# Patient Record
Sex: FEMALE | Race: WHITE | NOT HISPANIC OR LATINO | Employment: UNEMPLOYED | URBAN - METROPOLITAN AREA
[De-identification: names, ages, dates, MRNs, and addresses within clinical notes are randomized per-mention and may not be internally consistent; named-entity substitution may affect disease eponyms.]

---

## 2018-11-01 LAB — PAP SMEAR - HIM PATIENT REPORTED: NEGATIVE

## 2021-02-17 LAB — HIV 1&2 EXT: NORMAL

## 2021-09-10 ENCOUNTER — HOSPITAL ENCOUNTER (INPATIENT)
Facility: HOSPITAL | Age: 36
LOS: 2 days | Discharge: HOME | End: 2021-09-13
Attending: OBSTETRICS & GYNECOLOGY | Admitting: OBSTETRICS & GYNECOLOGY
Payer: COMMERCIAL

## 2021-09-10 DIAGNOSIS — Z32.01 PREGNANCY TEST POSITIVE: Primary | ICD-10-CM

## 2021-09-10 DIAGNOSIS — Z3A.41 41 WEEKS GESTATION OF PREGNANCY: Primary | ICD-10-CM

## 2021-09-10 DIAGNOSIS — O48.0 41 WEEKS GESTATION OF PREGNANCY: Primary | ICD-10-CM

## 2021-09-11 ENCOUNTER — ANESTHESIA (OUTPATIENT)
Dept: OBGYN | Facility: HOSPITAL | Age: 36
End: 2021-09-11
Payer: COMMERCIAL

## 2021-09-11 ENCOUNTER — ANESTHESIA EVENT (OUTPATIENT)
Dept: OBGYN | Facility: HOSPITAL | Age: 36
End: 2021-09-11
Payer: COMMERCIAL

## 2021-09-11 PROBLEM — Z34.90 PREGNANCY: Status: ACTIVE | Noted: 2021-09-11

## 2021-09-11 PROBLEM — Z36.89 ENCOUNTER FOR TRIAGE IN PREGNANT PATIENT: Status: ACTIVE | Noted: 2021-09-11

## 2021-09-11 LAB
ABO/RH(D): NORMAL
ANTIBODY SCREEN: NEGATIVE
HOLD SPECIMEN: NORMAL
SARS-COV-2 RNA RESP QL NAA+PROBE: NEGATIVE
SPECIMEN EXPIRATION DATE: NORMAL
T PALLIDUM AB SER QL: NEGATIVE

## 2021-09-11 PROCEDURE — 10907ZC DRAINAGE OF AMNIOTIC FLUID, THERAPEUTIC FROM PRODUCTS OF CONCEPTION, VIA NATURAL OR ARTIFICIAL OPENING: ICD-10-PCS | Performed by: OBSTETRICS & GYNECOLOGY

## 2021-09-11 PROCEDURE — 250N000013 HC RX MED GY IP 250 OP 250 PS 637: Performed by: OBSTETRICS & GYNECOLOGY

## 2021-09-11 PROCEDURE — 10H07YZ INSERTION OF OTHER DEVICE INTO PRODUCTS OF CONCEPTION, VIA NATURAL OR ARTIFICIAL OPENING: ICD-10-PCS | Performed by: OBSTETRICS & GYNECOLOGY

## 2021-09-11 PROCEDURE — 120N000001 HC R&B MED SURG/OB

## 2021-09-11 PROCEDURE — 250N000011 HC RX IP 250 OP 636: Performed by: ANESTHESIOLOGY

## 2021-09-11 PROCEDURE — 370N000003 HC ANESTHESIA WARD SERVICE

## 2021-09-11 PROCEDURE — 0HQ9XZZ REPAIR PERINEUM SKIN, EXTERNAL APPROACH: ICD-10-PCS | Performed by: OBSTETRICS & GYNECOLOGY

## 2021-09-11 PROCEDURE — 86780 TREPONEMA PALLIDUM: CPT | Performed by: OBSTETRICS & GYNECOLOGY

## 2021-09-11 PROCEDURE — 36415 COLL VENOUS BLD VENIPUNCTURE: CPT | Performed by: OBSTETRICS & GYNECOLOGY

## 2021-09-11 PROCEDURE — 250N000011 HC RX IP 250 OP 636: Performed by: OBSTETRICS & GYNECOLOGY

## 2021-09-11 PROCEDURE — 258N000003 HC RX IP 258 OP 636: Performed by: OBSTETRICS & GYNECOLOGY

## 2021-09-11 PROCEDURE — 250N000009 HC RX 250: Performed by: ANESTHESIOLOGY

## 2021-09-11 PROCEDURE — 86900 BLOOD TYPING SEROLOGIC ABO: CPT | Performed by: OBSTETRICS & GYNECOLOGY

## 2021-09-11 PROCEDURE — 87635 SARS-COV-2 COVID-19 AMP PRB: CPT | Performed by: OBSTETRICS & GYNECOLOGY

## 2021-09-11 PROCEDURE — 722N000001 HC LABOR CARE VAGINAL DELIVERY SINGLE

## 2021-09-11 RX ORDER — LIDOCAINE 40 MG/G
CREAM TOPICAL
Status: DISCONTINUED | OUTPATIENT
Start: 2021-09-11 | End: 2021-09-11 | Stop reason: HOSPADM

## 2021-09-11 RX ORDER — OXYTOCIN 10 [USP'U]/ML
10 INJECTION, SOLUTION INTRAMUSCULAR; INTRAVENOUS
Status: DISCONTINUED | OUTPATIENT
Start: 2021-09-11 | End: 2021-09-13 | Stop reason: HOSPADM

## 2021-09-11 RX ORDER — ONDANSETRON 2 MG/ML
4 INJECTION INTRAMUSCULAR; INTRAVENOUS EVERY 6 HOURS PRN
Status: DISCONTINUED | OUTPATIENT
Start: 2021-09-11 | End: 2021-09-11 | Stop reason: HOSPADM

## 2021-09-11 RX ORDER — HYDROCORTISONE 2.5 %
CREAM (GRAM) TOPICAL 3 TIMES DAILY PRN
Status: DISCONTINUED | OUTPATIENT
Start: 2021-09-11 | End: 2021-09-13 | Stop reason: HOSPADM

## 2021-09-11 RX ORDER — IBUPROFEN 600 MG/1
600 TABLET, FILM COATED ORAL
Status: DISCONTINUED | OUTPATIENT
Start: 2021-09-11 | End: 2021-09-13 | Stop reason: HOSPADM

## 2021-09-11 RX ORDER — METHYLERGONOVINE MALEATE 0.2 MG/ML
200 INJECTION INTRAVENOUS
Status: DISCONTINUED | OUTPATIENT
Start: 2021-09-11 | End: 2021-09-11 | Stop reason: HOSPADM

## 2021-09-11 RX ORDER — MORPHINE SULFATE 10 MG/ML
10 INJECTION, SOLUTION INTRAMUSCULAR; INTRAVENOUS ONCE
Status: COMPLETED | OUTPATIENT
Start: 2021-09-11 | End: 2021-09-11

## 2021-09-11 RX ORDER — NALOXONE HYDROCHLORIDE 0.4 MG/ML
0.2 INJECTION, SOLUTION INTRAMUSCULAR; INTRAVENOUS; SUBCUTANEOUS
Status: DISCONTINUED | OUTPATIENT
Start: 2021-09-11 | End: 2021-09-11 | Stop reason: HOSPADM

## 2021-09-11 RX ORDER — MISOPROSTOL 200 UG/1
800 TABLET ORAL
Status: DISCONTINUED | OUTPATIENT
Start: 2021-09-11 | End: 2021-09-11 | Stop reason: HOSPADM

## 2021-09-11 RX ORDER — METOCLOPRAMIDE 10 MG/1
10 TABLET ORAL EVERY 6 HOURS PRN
Status: DISCONTINUED | OUTPATIENT
Start: 2021-09-11 | End: 2021-09-11 | Stop reason: HOSPADM

## 2021-09-11 RX ORDER — NALOXONE HYDROCHLORIDE 0.4 MG/ML
0.4 INJECTION, SOLUTION INTRAMUSCULAR; INTRAVENOUS; SUBCUTANEOUS
Status: DISCONTINUED | OUTPATIENT
Start: 2021-09-11 | End: 2021-09-11 | Stop reason: HOSPADM

## 2021-09-11 RX ORDER — MISOPROSTOL 200 UG/1
400 TABLET ORAL
Status: DISCONTINUED | OUTPATIENT
Start: 2021-09-11 | End: 2021-09-11 | Stop reason: HOSPADM

## 2021-09-11 RX ORDER — EPHEDRINE SULFATE 50 MG/ML
5 INJECTION, SOLUTION INTRAMUSCULAR; INTRAVENOUS; SUBCUTANEOUS
Status: DISCONTINUED | OUTPATIENT
Start: 2021-09-11 | End: 2021-09-11 | Stop reason: HOSPADM

## 2021-09-11 RX ORDER — METHYLERGONOVINE MALEATE 0.2 MG/ML
200 INJECTION INTRAVENOUS
Status: DISCONTINUED | OUTPATIENT
Start: 2021-09-11 | End: 2021-09-13 | Stop reason: HOSPADM

## 2021-09-11 RX ORDER — MODIFIED LANOLIN
OINTMENT (GRAM) TOPICAL
Status: DISCONTINUED | OUTPATIENT
Start: 2021-09-11 | End: 2021-09-13 | Stop reason: HOSPADM

## 2021-09-11 RX ORDER — IBUPROFEN 800 MG/1
800 TABLET, FILM COATED ORAL EVERY 6 HOURS PRN
Status: DISCONTINUED | OUTPATIENT
Start: 2021-09-11 | End: 2021-09-13 | Stop reason: HOSPADM

## 2021-09-11 RX ORDER — METOCLOPRAMIDE HYDROCHLORIDE 5 MG/ML
10 INJECTION INTRAMUSCULAR; INTRAVENOUS EVERY 6 HOURS PRN
Status: DISCONTINUED | OUTPATIENT
Start: 2021-09-11 | End: 2021-09-11 | Stop reason: HOSPADM

## 2021-09-11 RX ORDER — PRENATAL VIT/IRON FUM/FOLIC AC 27MG-0.8MG
1 TABLET ORAL DAILY
COMMUNITY
End: 2021-12-10

## 2021-09-11 RX ORDER — KETOROLAC TROMETHAMINE 30 MG/ML
30 INJECTION, SOLUTION INTRAMUSCULAR; INTRAVENOUS
Status: DISCONTINUED | OUTPATIENT
Start: 2021-09-11 | End: 2021-09-13 | Stop reason: HOSPADM

## 2021-09-11 RX ORDER — LIDOCAINE HYDROCHLORIDE 20 MG/ML
SOLUTION OROPHARYNGEAL
Status: DISPENSED
Start: 2021-09-11 | End: 2021-09-11

## 2021-09-11 RX ORDER — MISOPROSTOL 200 UG/1
400 TABLET ORAL
Status: DISCONTINUED | OUTPATIENT
Start: 2021-09-11 | End: 2021-09-13 | Stop reason: HOSPADM

## 2021-09-11 RX ORDER — HYDROXYZINE HYDROCHLORIDE 50 MG/1
100 TABLET, FILM COATED ORAL ONCE
Status: COMPLETED | OUTPATIENT
Start: 2021-09-11 | End: 2021-09-11

## 2021-09-11 RX ORDER — LIDOCAINE HYDROCHLORIDE 10 MG/ML
INJECTION, SOLUTION EPIDURAL; INFILTRATION; INTRACAUDAL; PERINEURAL
Status: DISPENSED
Start: 2021-09-11 | End: 2021-09-11

## 2021-09-11 RX ORDER — MISOPROSTOL 200 UG/1
800 TABLET ORAL
Status: DISCONTINUED | OUTPATIENT
Start: 2021-09-11 | End: 2021-09-13 | Stop reason: HOSPADM

## 2021-09-11 RX ORDER — ONDANSETRON 2 MG/ML
4 INJECTION INTRAMUSCULAR; INTRAVENOUS EVERY 6 HOURS PRN
Status: DISCONTINUED | OUTPATIENT
Start: 2021-09-11 | End: 2021-09-13 | Stop reason: HOSPADM

## 2021-09-11 RX ORDER — FENTANYL CITRATE 50 UG/ML
50-100 INJECTION, SOLUTION INTRAMUSCULAR; INTRAVENOUS
Status: DISCONTINUED | OUTPATIENT
Start: 2021-09-11 | End: 2021-09-11 | Stop reason: HOSPADM

## 2021-09-11 RX ORDER — DOCUSATE SODIUM 100 MG/1
100 CAPSULE, LIQUID FILLED ORAL DAILY
Status: DISCONTINUED | OUTPATIENT
Start: 2021-09-11 | End: 2021-09-13 | Stop reason: HOSPADM

## 2021-09-11 RX ORDER — PROCHLORPERAZINE 25 MG
25 SUPPOSITORY, RECTAL RECTAL EVERY 12 HOURS PRN
Status: DISCONTINUED | OUTPATIENT
Start: 2021-09-11 | End: 2021-09-11 | Stop reason: HOSPADM

## 2021-09-11 RX ORDER — SODIUM CHLORIDE, SODIUM LACTATE, POTASSIUM CHLORIDE, CALCIUM CHLORIDE 600; 310; 30; 20 MG/100ML; MG/100ML; MG/100ML; MG/100ML
INJECTION, SOLUTION INTRAVENOUS CONTINUOUS PRN
Status: DISCONTINUED | OUTPATIENT
Start: 2021-09-11 | End: 2021-09-13 | Stop reason: HOSPADM

## 2021-09-11 RX ORDER — ACETAMINOPHEN 325 MG/1
650 TABLET ORAL EVERY 4 HOURS PRN
Status: DISCONTINUED | OUTPATIENT
Start: 2021-09-11 | End: 2021-09-13 | Stop reason: HOSPADM

## 2021-09-11 RX ORDER — CARBOPROST TROMETHAMINE 250 UG/ML
250 INJECTION, SOLUTION INTRAMUSCULAR
Status: DISCONTINUED | OUTPATIENT
Start: 2021-09-11 | End: 2021-09-11 | Stop reason: HOSPADM

## 2021-09-11 RX ORDER — BUPIVACAINE HYDROCHLORIDE 2.5 MG/ML
INJECTION, SOLUTION EPIDURAL; INFILTRATION; INTRACAUDAL
Status: COMPLETED | OUTPATIENT
Start: 2021-09-11 | End: 2021-09-11

## 2021-09-11 RX ORDER — OXYTOCIN/0.9 % SODIUM CHLORIDE 30/500 ML
340 PLASTIC BAG, INJECTION (ML) INTRAVENOUS CONTINUOUS PRN
Status: DISCONTINUED | OUTPATIENT
Start: 2021-09-11 | End: 2021-09-11 | Stop reason: HOSPADM

## 2021-09-11 RX ORDER — OXYTOCIN/0.9 % SODIUM CHLORIDE 30/500 ML
100-340 PLASTIC BAG, INJECTION (ML) INTRAVENOUS CONTINUOUS PRN
Status: DISCONTINUED | OUTPATIENT
Start: 2021-09-11 | End: 2021-09-13 | Stop reason: HOSPADM

## 2021-09-11 RX ORDER — CARBOPROST TROMETHAMINE 250 UG/ML
250 INJECTION, SOLUTION INTRAMUSCULAR
Status: DISCONTINUED | OUTPATIENT
Start: 2021-09-11 | End: 2021-09-13 | Stop reason: HOSPADM

## 2021-09-11 RX ORDER — OXYTOCIN 10 [USP'U]/ML
10 INJECTION, SOLUTION INTRAMUSCULAR; INTRAVENOUS
Status: DISCONTINUED | OUTPATIENT
Start: 2021-09-11 | End: 2021-09-11 | Stop reason: HOSPADM

## 2021-09-11 RX ORDER — OXYTOCIN/0.9 % SODIUM CHLORIDE 30/500 ML
340 PLASTIC BAG, INJECTION (ML) INTRAVENOUS CONTINUOUS PRN
Status: DISCONTINUED | OUTPATIENT
Start: 2021-09-11 | End: 2021-09-13 | Stop reason: HOSPADM

## 2021-09-11 RX ORDER — ONDANSETRON 4 MG/1
4 TABLET, ORALLY DISINTEGRATING ORAL EVERY 6 HOURS PRN
Status: DISCONTINUED | OUTPATIENT
Start: 2021-09-11 | End: 2021-09-11 | Stop reason: HOSPADM

## 2021-09-11 RX ORDER — PROCHLORPERAZINE MALEATE 10 MG
10 TABLET ORAL EVERY 6 HOURS PRN
Status: DISCONTINUED | OUTPATIENT
Start: 2021-09-11 | End: 2021-09-11 | Stop reason: HOSPADM

## 2021-09-11 RX ORDER — BISACODYL 10 MG
10 SUPPOSITORY, RECTAL RECTAL DAILY PRN
Status: DISCONTINUED | OUTPATIENT
Start: 2021-09-11 | End: 2021-09-13 | Stop reason: HOSPADM

## 2021-09-11 RX ORDER — NALBUPHINE HYDROCHLORIDE 10 MG/ML
2.5-5 INJECTION, SOLUTION INTRAMUSCULAR; INTRAVENOUS; SUBCUTANEOUS EVERY 6 HOURS PRN
Status: DISCONTINUED | OUTPATIENT
Start: 2021-09-11 | End: 2021-09-13 | Stop reason: HOSPADM

## 2021-09-11 RX ADMIN — KETOROLAC TROMETHAMINE 30 MG: 30 INJECTION, SOLUTION INTRAMUSCULAR at 19:07

## 2021-09-11 RX ADMIN — SODIUM CHLORIDE, POTASSIUM CHLORIDE, SODIUM LACTATE AND CALCIUM CHLORIDE 1000 ML: 600; 310; 30; 20 INJECTION, SOLUTION INTRAVENOUS at 13:48

## 2021-09-11 RX ADMIN — ACETAMINOPHEN 650 MG: 325 TABLET ORAL at 19:06

## 2021-09-11 RX ADMIN — SODIUM CHLORIDE, POTASSIUM CHLORIDE, SODIUM LACTATE AND CALCIUM CHLORIDE 200 ML/HR: 600; 310; 30; 20 INJECTION, SOLUTION INTRAVENOUS at 15:10

## 2021-09-11 RX ADMIN — MORPHINE SULFATE 10 MG: 10 INJECTION INTRAVENOUS at 04:05

## 2021-09-11 RX ADMIN — HYDROXYZINE HYDROCHLORIDE 100 MG: 50 TABLET ORAL at 02:05

## 2021-09-11 RX ADMIN — Medication 5 MG: at 15:10

## 2021-09-11 RX ADMIN — Medication: at 15:04

## 2021-09-11 RX ADMIN — ONDANSETRON 4 MG: 4 TABLET, ORALLY DISINTEGRATING ORAL at 10:53

## 2021-09-11 RX ADMIN — Medication 5 MG: at 16:45

## 2021-09-11 RX ADMIN — BUPIVACAINE HYDROCHLORIDE 10 ML: 2.5 INJECTION, SOLUTION EPIDURAL; INFILTRATION; INTRACAUDAL at 14:52

## 2021-09-11 RX ADMIN — FENTANYL CITRATE 100 MCG: 50 INJECTION, SOLUTION INTRAMUSCULAR; INTRAVENOUS at 13:53

## 2021-09-11 RX ADMIN — Medication 5 MG: at 15:15

## 2021-09-11 RX ADMIN — ONDANSETRON 4 MG: 2 INJECTION INTRAMUSCULAR; INTRAVENOUS at 19:20

## 2021-09-11 RX ADMIN — ONDANSETRON 4 MG: 4 TABLET, ORALLY DISINTEGRATING ORAL at 02:57

## 2021-09-11 ASSESSMENT — MIFFLIN-ST. JEOR: SCORE: 1558.17

## 2021-09-11 NOTE — PROGRESS NOTES
Dr Broderick notified of patient arrival and status.    Patient may have morphine 10mg IM and vistaril 100mg PO if she desires to promote comfort and rest.  Nitrous Oxide ok to use once Covid result is back.  Patient may have an epidural once in active labor.

## 2021-09-11 NOTE — PROGRESS NOTES
Nitrous consent reviewed and signed by patient and RN.  Nitrous set up and ready in the room. Patient in tub right now and declines until she returns to bed.

## 2021-09-11 NOTE — ANESTHESIA PROCEDURE NOTES
Epidural catheter Procedure Note  Pre-Procedure   Staff -        Anesthesiologist:  Monty Ortiz MD       Performed By: anesthesiologist       Location: OB       Procedure Start/Stop Times: 9/11/2021 2:45 PM and 9/11/2021 3:00 PM       Pre-Anesthestic Checklist: patient identified, IV checked, risks and benefits discussed, informed consent, monitors and equipment checked, pre-op evaluation, at physician/surgeon's request and post-op pain management  Timeout:       Correct Patient: Yes        Correct Procedure: Yes        Correct Site: Yes        Correct Position: Yes   Procedure Documentation  Procedure: epidural catheter       Diagnosis: IUP       Patient Position: sitting       Patient Prep/Sterile Barriers: sterile gloves, mask, patient draped       Skin prep: Chloraprep      Local skin infiltrated with mL of 1% lidocaine.        Insertion Site: T12-L1. (midline approach).       WAYNE at 6 cm.       Needle Type: HRBoss       Needle Gauge: 18.        Needle Length (Inches): 3.5        Catheter: 20 G.         Catheter threaded easily.         6 cm epidural space.         Threaded 12 cm at skin.         # of attempts: 1 and  # of redirects:  0    Assessment/Narrative         Paresthesias: No.      Test dose of 3 mL at 14:55 CDT.         Test dose negative, 3 minutes after injection, for signs of intravascular, subdural, or intrathecal injection.       Aspiration negative for Heme or CSF via Epidural Catheter.    Medication(s) Administered   0.25% Bupivacaine PF (Epidural), 10 mL  Medication Administration Time: 9/11/2021 2:52 PM

## 2021-09-11 NOTE — H&P
"OB HISTORY AND PHYSICAL UPDATE ADMISSION EXAM    Sarah Hatfield  1985  [id number]    HPI: 36yo  @41 3/7 wks.  Here for labor.      Estimated Date of Delivery: Sep 1, 2021                       EGA 41w3d    OB History    Para Term  AB Living   1 0 0 0 0 0   SAB TAB Ectopic Multiple Live Births   0 0 0 0 0      # Outcome Date GA Lbr Jose/2nd Weight Sex Delivery Anes PTL Lv   1 Current                Prenatal Complications None    Exam:    /55   Pulse 87   Temp 97.8  F (36.6  C) (Oral)   Resp 18   Ht 1.753 m (5' 9\")   Wt 79.4 kg (175 lb)   LMP 2020 (Exact Date)   SpO2 98%   Breastfeeding No   BMI 25.84 kg/m          HEENT          WNL              Heart              WNL               Lungs             WNL                      Abdomen        WNL                       Extremities     WNL                     Vaginal exam   /-1 per RN      Fetal Status     Category 1    Assessment: Labor    Plan: Spontaneous labor, anticipate vaginal delivery      Carrie Pavon DO, FACOG  2021 8:15 AM     "

## 2021-09-11 NOTE — PROGRESS NOTES
Patient arrived to [facility] for [place].  Reports contractions since 10AM and only getting worse.  Reports some bloody show, no leaking of fluid, feeling baby move well.  Monitors applied x2, SVE 2/70/-2.  Patient would like to do nitrous, maybe epidural.  Denies headache or visual changes, no clonus or swelling. Covid swab collected and sent.

## 2021-09-11 NOTE — L&D DELIVERY NOTE
OB Vaginal Delivery Note    Sarah Hatfield MRN# [id number]   Age: 35 year old YOB: 1985       GA: 41w3d  GP:   Labor Complications:    EBL:   mL  Delivery QBL:    Delivery Type:    ROM to Delivery Time: (Delivered) Hours: 6 Minutes: 36  [place] Weight:     1 Minute 5 Minute 10 Minute   Apgar Totals: 9   9        BIGG LOPEZ;OMID MORENO;HAIDER BENSON     Delivery Details:  Sarah Hatfield, a 35 year old  female delivered a viable infant with apgars of 9  and 9 .   Delivery was via  to a sterile field under epidural anesthesia. Infant delivered in vertex  left  occiput  anterior  position. Anterior and posterior shoulders delivered without difficulty. The cord was clamped, cut twice and 3 vessels  were noted. Cord blood was obtained in routine fashion with the following disposition: lab .      Cord complications: none   Placenta delivered at 2021  6:37 PM . Placental disposition was Patient possesion . Fundal massage performed and fundus found to be firm.     Episiotomy: none    Perineum, vagina, cervix were inspected, and the following lacerations were noted:   Perineal lacerations: 1st                Any lacerations were repaired in the usual fashion using 3-0 vicryl.    Excellent hemostasis was noted. Needle count correct. Infant and patient in delivery room in good and stable condition.        Liu Pending Baby Sarah [[id number]]    Labor Event Times    Labor onset date: 9/10/21 Onset time: 10:00 AM   Dilation complete date: 21 Complete time:  4:38 PM   Start pushing date/time: 2021 1646      Labor Events     labor?: No   steroids: None  Labor Type: Spontaneous  Predominate monitoring during 1st stage: intermittent auscultation     Antibiotics received during labor?: No     Rupture date/time: 21 1155   Rupture type: Artificial Rupture of Membranes  Fluid color: Clear  Fluid odor: Normal     Augmentation: AROM  Indications for  augmentation: Ineffective Contraction Pattern  1:1 continuous labor support provided by?: VICKI pearson       Delivery/Placenta Date and Time    Delivery Date: 21 Delivery Time:  6:31 PM   Placenta Date/Time: 2021  6:37 PM  Oxytocin given at the time of delivery: after delivery of baby   Other personnel present at delivery:  Provider Role   Carrie Pavon MD Stern, Amy L, RN    Leonela Cornejo RN          Vaginal Counts     Initial count performed by 2 team members:  Two Team Members   carol cornejo       Needles Suture Needles Sponges (RETIRED) Instruments   Initial counts   5    Added to count  1     Relief counts       Final counts             Placed during labor Accounted for at the end of labor   FSE No NA   IUPC No NA   Cervadil NA NA                     Apgars    Living status: Living   1 Minute 5 Minute 10 Minute 15 Minute 20 Minute   Skin color: 1  1       Heart rate: 2  2       Reflex irritability: 2  2       Muscle tone: 2  2       Respiratory effort: 2  2       Total: 9  9       Apgars assigned by: JOSH     Cord    Vessels: 3 Vessels    Cord Complications: None               Cord Blood Disposition: Lab    Gases Sent?: No    Delayed cord clamping?: Yes    Cord Clamping Delay (seconds):  seconds    Stem cell collection?: No        Resuscitation    Methods: None  Output in Delivery Room: Stool     [place] Measurements    Output in delivery room: Stool     Skin to Skin and Feeding Plan    Skin to skin initiation date/time: 1841    Skin to skin with: Mother  Skin to skin end date/time:        Labor Events and Shoulder Dystocia    Fetal Tracing Prior to Delivery: Category 1  Shoulder dystocia present?: Neg     Delivery (Maternal) (Provider to Complete) ([id number])    Episiotomy: None  Perineal lacerations: 1st    Repair suture: 3-0 Vicryl  Number of repair packets: 1  Genital tract inspection done: Pos     Blood Loss  Mother: Liu Sarah ROULA #[id number]   Start of  Mother's Information    Delivery Blood Loss  09/10/21 1000 - 09/11/21 1844    None           End of Mother's Information  Mother: Sarah Hatfield #[id number]          Delivery - Provider to Complete ([id number])                   Other personnel:  Provider Role   Carrie Pavon MD Stern, Amy L, RN Kahn, Ellen P, RN                 Placenta    Date/Time: 9/11/2021  6:37 PM  Removal: Spontaneous  Disposition: Patient possesion           Anesthesia    Method: INTRAVENOUS , Other     Analgesic:  BIRTH HISTORY: ANALGESIC   FENTANYL CITRATE 100 MCG/2ML IV SOSY   MORPHINE SULFATE 10 MG/ML IJ SOLN             Presentation and Position    Presentation: Vertex    Position: Left Occiput Anterior                 Carrie Pavon MD

## 2021-09-11 NOTE — PROGRESS NOTES
Sarah is feeling an urge to push.  SVE by writer and there has been no change. Sarah requesting pharmacological pain management for coping with labor.  Reviewed options for IV fentanyl and labor epidural.  Sarah and Eran discussed and would like to try IV fentanyl while the fluid bolus is infused for an epidural, and then decide if she would like to move forward with an epidural.  Dr. Pavon given update by Rachna Meek, Charge RN, and orders received for IV fentanyl and labor epidural.  100mcg IV fentanyl given at 1353, see MAR.  Patient coping well with fentanyl at this time.

## 2021-09-11 NOTE — ANESTHESIA PREPROCEDURE EVALUATION
Anesthesia Pre-Procedure Evaluation    Patient: Sarah Hatfield   MRN: [id number] : 1985        Preoperative Diagnosis: * No pre-op diagnosis entered *   Procedure : * No procedures listed *     History reviewed. No pertinent past medical history.   History reviewed. No pertinent surgical history.   Allergies   Allergen Reactions     Cefaclor Hives      Social History     Tobacco Use     Smoking status: Never Smoker     Smokeless tobacco: Never Used   Substance Use Topics     Alcohol use: Not Currently      Wt Readings from Last 1 Encounters:   21 79.4 kg (175 lb)        Anesthesia Evaluation   Pt has not had prior anesthetic         ROS/MED HX  ENT/Pulmonary:  - neg pulmonary ROS     Neurologic:  - neg neurologic ROS     Cardiovascular:  - neg cardiovascular ROS     METS/Exercise Tolerance:     Hematologic:  - neg hematologic  ROS     Musculoskeletal:  - neg musculoskeletal ROS     GI/Hepatic:  - neg GI/hepatic ROS     Renal/Genitourinary:  - neg Renal ROS     Endo:  - neg endo ROS     Psychiatric/Substance Use:       Infectious Disease:  - neg infectious disease ROS     Malignancy:       Other:      (+) Possibly pregnant, ,         Physical Exam    Airway  airway exam normal           Respiratory Devices and Support         Dental  no notable dental history         Cardiovascular   cardiovascular exam normal          Pulmonary   pulmonary exam normal                OUTSIDE LABS:  CBC: No results found for: WBC, HGB, HCT, PLT  BMP: No results found for: NA, POTASSIUM, CHLORIDE, CO2, BUN, CR, GLC  COAGS: No results found for: PTT, INR, FIBR  POC: No results found for: BGM, HCG, HCGS  HEPATIC: No results found for: ALBUMIN, PROTTOTAL, ALT, AST, GGT, ALKPHOS, BILITOTAL, BILIDIRECT, MARIA DE JESUS  OTHER: No results found for: PH, LACT, A1C, HERIBERTO, PHOS, MAG, LIPASE, AMYLASE, TSH, T4, T3, CRP, SED    Anesthesia Plan    ASA Status:  2   NPO Status:  NPO Appropriate    Anesthesia Type: Epidural.               Consents    Anesthesia Plan(s) and associated risks, benefits, and realistic alternatives discussed. Questions answered and patient/representative(s) expressed understanding.     - Discussed with:  Patient      - Extended Intubation/Ventilatory Support Discussed: No.      - Patient is DNR/DNI Status: No    Use of blood products discussed: No .     Postoperative Care            Comments:                Monty Ortiz MD

## 2021-09-11 NOTE — PROGRESS NOTES
"[facility] Labor and Delivery Progress Note    Sarah Hatfield MRN# [id number]   Age: 35 year old YOB: 1985           Subjective:   Sarah is laboring, coping well with support from  Eran and Paolo Rosas           Objective:   Patient Vitals for the past 24 hrs:   BP Temp Temp src Pulse Resp SpO2 Height Weight BMI (Calculated) Oximeter Heart Rate   21 0725 101/55 97.8  F (36.6  C) Oral -- -- -- -- -- -- 60 bpm   21 0430 117/68 98.4  F (36.9  C) Oral 87 18 -- -- -- -- --   21 0045 139/81 97.9  F (36.6  C) Oral 103 18 98 % 1.753 m (5' 9\") 79.4 kg (175 lb) 25.84 --         Cervical Exam: 5 / 100% / -1      Position: Mid    Membranes: Intact     Fetal Heart Rate:    Monitor: external US - now intermittent auscultation   Variability: moderate (amplitude range 6 to 25 bpm)    Baseline Rate: normal range    Fetal Heart Rate Tracing: Category 1          Assessment:   Sarah Hatfield is a 35 year old  who is 41w3d here with  Paco Rosas in spontaneous labor.  She was scheduled for a post-dates induction for tomorrow but came in in spontaneous labor. FHT Category 1, EFM removed.  She is katia every 2-5 min, palpating moderate to strong.  VSS.          Plan:   Anticipate  - support patient and family with hopes of low intervention labor and birth.      Leonela Cornejo, RNC    "

## 2021-09-11 NOTE — PLAN OF CARE
Problem: Labor Pain (Labor)  Goal: Acceptable Pain Control  Outcome: Improving   Patient is coping well with labor through position changes, breath work, and birth team support.  Will continue to monitor for coping and offer interventions as needed.

## 2021-09-11 NOTE — PROGRESS NOTES
Dr Broderick updated on patient unchanged SVE status, will plan to do augmentation today if labor does not progress spontaneously.

## 2021-09-11 NOTE — PROGRESS NOTES
Patient coping well with spouse and  at bedside. Her nausea is improved after ODT Zofran at 0300.  Reports contractions feel longer and about 2-3 minutes apart, same intensity.  SVE requested, 2/70/-2.

## 2021-09-12 LAB — HGB BLD-MCNC: 11.4 G/DL (ref 11.7–15.7)

## 2021-09-12 PROCEDURE — 36415 COLL VENOUS BLD VENIPUNCTURE: CPT | Performed by: OBSTETRICS & GYNECOLOGY

## 2021-09-12 PROCEDURE — 250N000013 HC RX MED GY IP 250 OP 250 PS 637: Performed by: OBSTETRICS & GYNECOLOGY

## 2021-09-12 PROCEDURE — 120N000001 HC R&B MED SURG/OB

## 2021-09-12 PROCEDURE — 85018 HEMOGLOBIN: CPT | Performed by: OBSTETRICS & GYNECOLOGY

## 2021-09-12 RX ADMIN — IBUPROFEN 800 MG: 800 TABLET, FILM COATED ORAL at 06:46

## 2021-09-12 RX ADMIN — IBUPROFEN 800 MG: 800 TABLET, FILM COATED ORAL at 20:48

## 2021-09-12 RX ADMIN — IBUPROFEN 800 MG: 800 TABLET, FILM COATED ORAL at 13:50

## 2021-09-12 RX ADMIN — IBUPROFEN 800 MG: 800 TABLET, FILM COATED ORAL at 01:02

## 2021-09-12 RX ADMIN — DOCUSATE SODIUM 100 MG: 100 CAPSULE, LIQUID FILLED ORAL at 08:33

## 2021-09-12 NOTE — PROGRESS NOTES
Dr Pavon notified of patient fall in bathroom. No new orders at this time. She can be called for further evaluation if needed.

## 2021-09-12 NOTE — PLAN OF CARE
Problem: Adult Inpatient Plan of Care  Goal: Plan of Care Review  Outcome: Improving  Goal: Patient-Specific Goal (Individualized)  Outcome: Improving  Goal: Absence of Hospital-Acquired Illness or Injury  Outcome: Improving  Intervention: Prevent and Manage VTE (Venous Thromboembolism) Risk  Recent Flowsheet Documentation  Taken 9/12/2021 0030 by Dave Canada RN  VTE Prevention/Management:   ambulation promoted   fluids promoted  Intervention: Prevent Infection  Recent Flowsheet Documentation  Taken 9/12/2021 0030 by Dave Canada RN  Infection Prevention:   rest/sleep promoted   single patient room provided  Goal: Optimal Comfort and Wellbeing  Outcome: Improving  Intervention: Provide Person-Centered Care  Recent Flowsheet Documentation  Taken 9/12/2021 0030 by Dave Canada RN  Trust Relationship/Rapport:   care explained   choices provided   emotional support provided   empathic listening provided   questions answered   questions encouraged   reassurance provided   thoughts/feelings acknowledged  Goal: Readiness for Transition of Care  Outcome: Improving     Problem: Infection (Labor)  Goal: Absence of Infection Signs and Symptoms  Intervention: Prevent or Manage Infection  Recent Flowsheet Documentation  Taken 9/12/2021 0030 by Dave Canada RN  Infection Prevention:   rest/sleep promoted   single patient room provided    Intervention: Support Maternal Role Transition  Recent Flowsheet Documentation  Taken 9/12/2021 0030 by Dave Canada RN  Supportive Measures:   active listening utilized   counseling provided   self-care encouraged  Parent/Child Attachment Promotion:   caring behavior modeled   cue recognition promoted   parent/caregiver presence encouraged   participation in care promoted   rooming-in promoted   skin-to-skin contact encouraged     Problem: Bleeding (Postpartum Vaginal Delivery)  Goal: Hemostasis  Outcome: Improving     Problem: Infection (Postpartum Vaginal  Delivery)  Goal: Absence of Infection Signs and Symptoms  Outcome: Improving     Problem: Pain (Postpartum Vaginal Delivery)  Goal: Acceptable Pain Control  Outcome: Improving     Problem: Urinary Retention (Postpartum Vaginal Delivery)  Goal: Effective Urinary Elimination  Outcome: Improving

## 2021-09-12 NOTE — PROGRESS NOTES
Writer was at the bedside throughout active second stage, continuously monitoring EFM.  Dr. Pavon was at the bedside from 1809 through delivery.    Sarah's temp at 1859 was 103. At 1800, her temp had been 98.5.  Fetal tachycardia noted during pushing, starting around 1735.  Maternal HR was WDL.  Baby's temp at first set of VS was 101.3.  Dr. Pavon called and given update of abnormal VS.  Orders received to give acetaminophen and PO fluids and observe.  No further labs or medications needed at this time.      Leatha BETHEA RN at bedside at 1910 to assume care.  She is aware of the fevers and patient history and will monitor patient closely.

## 2021-09-12 NOTE — PLAN OF CARE
Problem: Pain (Postpartum Vaginal Delivery)  Goal: Acceptable Pain Control  Outcome: Improving   Ibuprofen for pain control. Pt starting to have sore nipples with breastfeeding. Encouraged and education about latching and sore nipple care.     Problem: Urinary Retention (Postpartum Vaginal Delivery)  Goal: Effective Urinary Elimination  Outcome: Improving   Voiding with no concerns.     Independent in room. Will continue to monitor. Noris Saenz RN

## 2021-09-12 NOTE — ANESTHESIA POSTPROCEDURE EVALUATION
Patient: Sarah Hatfield    * No procedures listed *    Diagnosis:* No pre-op diagnosis entered *  Diagnosis Additional Information: No value filed.    Anesthesia Type:  Epidural    Note:  Disposition: Inpatient   Postop Pain Control: Uneventful            Sign Out: Well controlled pain   PONV: No   Neuro/Psych: Uneventful            Sign Out: Acceptable/Baseline neuro status   Airway/Respiratory: Uneventful            Sign Out: Acceptable/Baseline resp. status   CV/Hemodynamics: Uneventful            Sign Out: Acceptable CV status; No obvious hypovolemia; No obvious fluid overload   Other NRE: NONE   DID A NON-ROUTINE EVENT OCCUR? No           Last vitals:  Vitals Value Taken Time   BP     Temp     Pulse     Resp     SpO2         Electronically Signed By: Monty Ortiz MD  September 12, 2021  9:17 AM

## 2021-09-12 NOTE — PROGRESS NOTES
Patient completed 2 hour post-partum recovery without issue.  She transferred to bathroom with standby assist, toileted and was able to transfer herself to bathroom and step in.  She bathed and was able to get out of tub independently.  RN assisted with dressing and after dresses patient lost balance and was assisted to floor, hitting her left knee.  Patient denies any other injury or pain at this time. Dr Mireles called and updated by Charge RN.

## 2021-09-12 NOTE — PROGRESS NOTES
"Postpartum Day 1: Vaginal Delivery    Subjective:  The patient feels well. The patient has no emotional concerns. Pain is well controlled with current medications. The patient is ambulating well. She is voiding and passing gas.The amount and color of the lochia is appropriate for the duration of recovery, patient denies clots. The baby is well.    Objective   The patient has a blood pressure which is within the normal range. The uterine fundus is firm. Urinary output is adequate.     Exam:   BP 96/51   Pulse 76   Temp 97.6  F (36.4  C) (Oral)   Resp 16   Ht 1.753 m (5' 9\")   Wt 79.4 kg (175 lb)   LMP 11/25/2020 (Exact Date)   SpO2 98%   Breastfeeding Unknown   BMI 25.84 kg/m    General: NAD  Abdomen: soft, NT   Fundus: firm, NT  Ext: no pain     Impression:   Normal postpartum course.     Plan:   - continue current care.  - Likely home tomorrow.      Magaly Mast, DO  [facility]  "

## 2021-09-13 VITALS
OXYGEN SATURATION: 98 % | DIASTOLIC BLOOD PRESSURE: 65 MMHG | TEMPERATURE: 97.8 F | WEIGHT: 175 LBS | HEART RATE: 68 BPM | RESPIRATION RATE: 16 BRPM | BODY MASS INDEX: 25.92 KG/M2 | HEIGHT: 69 IN | SYSTOLIC BLOOD PRESSURE: 106 MMHG

## 2021-09-13 PROCEDURE — 250N000013 HC RX MED GY IP 250 OP 250 PS 637: Performed by: OBSTETRICS & GYNECOLOGY

## 2021-09-13 RX ORDER — IBUPROFEN 600 MG/1
600 TABLET, FILM COATED ORAL EVERY 6 HOURS PRN
Qty: 30 TABLET | Refills: 0 | Status: SHIPPED | OUTPATIENT
Start: 2021-09-13 | End: 2021-12-10

## 2021-09-13 RX ADMIN — IBUPROFEN 800 MG: 800 TABLET, FILM COATED ORAL at 02:32

## 2021-09-13 RX ADMIN — IBUPROFEN 800 MG: 800 TABLET, FILM COATED ORAL at 14:30

## 2021-09-13 RX ADMIN — IBUPROFEN 800 MG: 800 TABLET, FILM COATED ORAL at 08:55

## 2021-09-13 RX ADMIN — DOCUSATE SODIUM 100 MG: 100 CAPSULE, LIQUID FILLED ORAL at 08:55

## 2021-09-13 NOTE — PROGRESS NOTES
"Outreach Note for Psychiatric    Sarah Hatfield  [id number]  1985    Chart reviewed, discharge plan discussed with patient, Sarah, and bedside RN, needs assessed. Sarah declines home nurse visit at this time. Follow-up postpartum appointment with OB planned in 6 weeks at [facility]; Sarah will schedule as instructed. Sarah states she has good support at home, has baby care essentials, and feels ready to discharge today with , \"Angel Ng\".    Outreach RN will continue to follow and assist if needed with discharge plan. No additional needs identified at this time.    "

## 2021-09-13 NOTE — PROGRESS NOTES
"Postpartum Day 2: Vaginal Delivery    Subjective:  The patient feels well. The patient has no emotional concerns. Pain is well controlled with current medications. The patient is ambulating well. She is voiding and passing gas.The amount and color of the lochia is appropriate for the duration of recovery, patient denies clots. The baby is doing well.    Objective   The patient has a blood pressure which is within the normal range. Urinary output is adequate.     Exam:   /65   Pulse 68   Temp 97.8  F (36.6  C) (Oral)   Resp 16   Ht 1.753 m (5' 9\")   Wt 79.4 kg (175 lb)   LMP 11/25/2020 (Exact Date)   SpO2 98%   Breastfeeding Unknown   BMI 25.84 kg/m    General: NAD  Abdomen: soft, NT  Fundus: Firm, nontender  Ext: no pain     Impression:   Normal postpartum course       Plan:        POSTPARTUM   - Doing well  - Discharge home.   - Follow up in clinic in 6 weeks     Kristy Elizabeth PA-C, [facility]   Office number: [phone]        "

## 2021-09-13 NOTE — LACTATION NOTE
"This note was copied from a baby's chart.  This writer met with Sarah to offer lactation support and education, as she has very sore nipples.  We reviewed hand expression techniques, positioning infant in the cross cradle hold and the asymmetrical latch technique.  This writer did a suck assessment with a gloved finger.  Infant has a hyper gag reflex and thrusts his tongue, pushing the finger out of his mouth.  Suck training done.  Infant now able to suck the finger 4-5 times, then he \"bites\" down on the finger.  With time, infant able to sustain his tongue out over the lower gum ridge.  This writer did note that infant has a tight band under his tongue, attaching 4-5 mm from the tip.  This writer observes infant is able to elevate his tongue, however, he can elevate the sides of his tongue higher than the center of the tongue.  He does have a v-shaped notch in the apex of the tongue when his tongue is extended.  MD and this writer observed the tight band of tissue under the tongue together.  This writer encouraged Sarah to inform infant's provider if she continues to suffer from sore nipples and reassess if the tight frenulum is interfering with breastfeeding.  Sarah was able to latch infant deeply onto the breast once this writer demonstrated the techniques x 1.  She notes that the nipple pain is at the beginning of the feeding, then the pain subsides.  Once Sarah shaped her breast tissue to match infant's mouth, she felt no nipple pain.  She will continue to supplement infant, prn and pump her breasts for every supplement given.  She was strongly encouraged to follow up at the outpatient lactation clinic.  She states she will call for an appointment prn.  Sarah verbalizes understanding of all education given.  She denies any further questions.        "

## 2021-09-13 NOTE — PLAN OF CARE
Problem: Adult Inpatient Plan of Care  Goal: Plan of Care Review  Outcome: Improving  Goal: Patient-Specific Goal (Individualized)  Outcome: Improving  Goal: Absence of Hospital-Acquired Illness or Injury  Outcome: Improving  Goal: Optimal Comfort and Wellbeing  Outcome: Improving  Intervention: Provide Person-Centered Care  Recent Flowsheet Documentation  Taken 9/13/2021 0015 by Dave Canada RN  Trust Relationship/Rapport:   care explained   choices provided   emotional support provided  Goal: Readiness for Transition of Care  Outcome: Improving     Problem: Adjustment to Role Transition (Postpartum Vaginal Delivery)  Goal: Successful Maternal Role Transition  Outcome: Improving  Intervention: Support Maternal Role Transition  Recent Flowsheet Documentation  Taken 9/13/2021 0015 by Dave Canada RN  Supportive Measures:   active listening utilized   counseling provided     Problem: Pain (Postpartum Vaginal Delivery)  Goal: Acceptable Pain Control  Outcome: Improving  Intervention: Prevent or Manage Pain  Recent Flowsheet Documentation  Taken 9/13/2021 0015 by Dave Canada RN  Pain Management Interventions: medication (see MAR)

## 2021-09-13 NOTE — DISCHARGE SUMMARY
.[facility] Discharge Summary    Sarah Hatfield MRN# [id number]   Age: 35 year old YOB: 1985     Date of Admission:  9/10/2021  Date of Discharge::  9/13/2021  Admitting Physician:  Steven Broderick MD  Discharge Physician:  Kristy Ambrose PA-C     [facility] clinic: [facility]          Admission Diagnoses:   Encounter for triage in pregnant patient [Z36.89]  Pregnancy [Z34.90]          Discharge Diagnosis:   Normal spontaneous vaginal delivery          Procedures:   Procedure(s): No additional procedures performed       No other procedures performed during this admission           Medications Prior to Admission:     Medications Prior to Admission   Medication Sig Dispense Refill Last Dose     Prenatal Vit-Fe Fumarate-FA (PRENATAL MULTIVITAMIN W/IRON) 27-0.8 MG tablet Take 1 tablet by mouth daily   9/10/2021 at Unknown time             Discharge Medications:     Current Discharge Medication List      START taking these medications    Details   docusate sodium (COLACE) 50 MG capsule Take 1 capsule (50 mg) by mouth 2 times daily  Qty: 30 capsule, Refills: 0    Associated Diagnoses: 41 weeks gestation of pregnancy; Spontaneous vaginal delivery; Postpartum care and examination of lactating mother      ibuprofen (ADVIL/MOTRIN) 600 MG tablet Take 1 tablet (600 mg) by mouth every 6 hours as needed for moderate pain  Qty: 30 tablet, Refills: 0    Associated Diagnoses: 41 weeks gestation of pregnancy; Spontaneous vaginal delivery; Postpartum care and examination of lactating mother         CONTINUE these medications which have NOT CHANGED    Details   Prenatal Vit-Fe Fumarate-FA (PRENATAL MULTIVITAMIN W/IRON) 27-0.8 MG tablet Take 1 tablet by mouth daily                   Consultations:   No consultations were requested during this admission          Brief History of Labor:               Hospital Course:   The patient's hospital course was unremarkable.  On  discharge, her pain was well controlled.  Vaginal bleeding is similar to peak menstrual flow.  Voiding without difficulty.  Ambulating well and tolerating a normal diet.  No fever.  Breastfeeding  well.  Infant is stable.  No bowel movement yet.*  She was discharged on post-partum day #2 .    Post-partum hemoglobin:   Hemoglobin   Date Value Ref Range Status   09/12/2021 11.4 (L) 11.7 - 15.7 g/dL Final             Discharge Instructions and Follow-Up:   Discharge diet: Regular   Discharge activity: Activity as tolerated   Discharge follow-up: Follow up with Dr. Melendez 6 weeks   Wound care: Drink plenty of fluids  Ice to area for comfort           Discharge Disposition:   Discharged to home         Kristy Ambrose PA-C

## 2021-09-13 NOTE — PLAN OF CARE
Problem: Adult Inpatient Plan of Care  Goal: Plan of Care Review  9/13/2021 0641 by Dave Canada RN  Outcome: Improving  9/13/2021 0138 by Dave Canada RN  Outcome: Improving  Goal: Patient-Specific Goal (Individualized)  9/13/2021 0641 by Dave Canada RN  Outcome: Improving  9/13/2021 0138 by Dave Canada RN  Outcome: Improving  Goal: Absence of Hospital-Acquired Illness or Injury  9/13/2021 0641 by Dave Canada RN  Outcome: Improving  9/13/2021 0138 by Dave Canada RN  Outcome: Improving  Goal: Optimal Comfort and Wellbeing  9/13/2021 0641 by Dave Canada RN  Outcome: Improving  9/13/2021 0138 by Dave Canada RN  Outcome: Improving  Intervention: Provide Person-Centered Care  Recent Flowsheet Documentation  Taken 9/13/2021 0015 by Dave Canada RN  Trust Relationship/Rapport:   care explained   choices provided   emotional support provided  Goal: Readiness for Transition of Care  9/13/2021 0641 by Dave Canada RN  Outcome: Improving  9/13/2021 0138 by Dave Canada RN  Outcome: Improving     Problem: Adjustment to Role Transition (Postpartum Vaginal Delivery)  Goal: Successful Maternal Role Transition  9/13/2021 0641 by Dave Canada RN  Outcome: Improving  9/13/2021 0138 by Dave Canada RN  Outcome: Improving  Intervention: Support Maternal Role Transition  Recent Flowsheet Documentation  Taken 9/13/2021 0015 by Dave Canada RN  Supportive Measures:   active listening utilized   counseling provided     Problem: Bleeding (Postpartum Vaginal Delivery)  Goal: Hemostasis  9/13/2021 0641 by Dave Canada RN  Outcome: Improving  9/13/2021 0138 by Dave Canada RN  Outcome: Improving     Problem: Infection (Postpartum Vaginal Delivery)  Goal: Absence of Infection Signs and Symptoms  9/13/2021 0641 by Dave Canada RN  Outcome: Improving  9/13/2021 0138 by Dave Canada, RN  Outcome: Improving      Problem: Pain (Postpartum Vaginal Delivery)  Goal: Acceptable Pain Control  9/13/2021 0641 by Dave Canada RN  Outcome: Improving  9/13/2021 0138 by Dave Canada RN  Outcome: Improving  Intervention: Prevent or Manage Pain  Recent Flowsheet Documentation  Taken 9/13/2021 0015 by Dave Canada RN  Pain Management Interventions: medication (see MAR)     Problem: Urinary Retention (Postpartum Vaginal Delivery)  Goal: Effective Urinary Elimination  9/13/2021 0641 by Dave Canada RN  Outcome: Improving  9/13/2021 0138 by Dave Canada RN  Outcome: Improving

## 2021-09-13 NOTE — PLAN OF CARE
Sarah's VSS.  She's ambulating and voiding without difficulty; independent with self cares.  She's been able to latch baby to breast well, independently, and occasionally asks for RN confirmation of deep latch. Baby has a very deep latch, but Sarah has sore, tender, reddened nipples; she's using nipple cream that she brought from home and says it's been soothing.  This RN discussed changing breastfeeding positions to help relieve soreness, and limiting baby's time at the breast to 10 min on a side, following up breastfeeds with donor milk for baby.  This RN also recommended holding off pumping until her nipples feel better.  Sarah and her  will set their phone alarm for every 2 3/4 hours, wake and change baby, and call for donor milk to be warmed while she's breastfeeding baby.

## 2021-10-17 ENCOUNTER — HEALTH MAINTENANCE LETTER (OUTPATIENT)
Age: 36
End: 2021-10-17

## 2021-10-25 ENCOUNTER — TRANSFERRED RECORDS (OUTPATIENT)
Dept: HEALTH INFORMATION MANAGEMENT | Facility: CLINIC | Age: 36
End: 2021-10-25
Payer: COMMERCIAL

## 2021-10-25 LAB
HPV ABSTRACT: NORMAL
PAP-ABSTRACT: NORMAL
PHQ9 SCORE: 1

## 2021-11-22 ENCOUNTER — MEDICAL CORRESPONDENCE (OUTPATIENT)
Dept: HEALTH INFORMATION MANAGEMENT | Facility: CLINIC | Age: 36
End: 2021-11-22
Payer: COMMERCIAL

## 2021-12-10 ENCOUNTER — OFFICE VISIT (OUTPATIENT)
Dept: FAMILY MEDICINE | Facility: CLINIC | Age: 36
End: 2021-12-10
Payer: COMMERCIAL

## 2021-12-10 VITALS
HEIGHT: 69 IN | SYSTOLIC BLOOD PRESSURE: 109 MMHG | HEART RATE: 63 BPM | DIASTOLIC BLOOD PRESSURE: 72 MMHG | BODY MASS INDEX: 21.77 KG/M2 | RESPIRATION RATE: 16 BRPM | OXYGEN SATURATION: 99 % | WEIGHT: 147 LBS

## 2021-12-10 DIAGNOSIS — D50.8 IRON DEFICIENCY ANEMIA SECONDARY TO INADEQUATE DIETARY IRON INTAKE: ICD-10-CM

## 2021-12-10 DIAGNOSIS — B00.9 HERPES SIMPLEX: ICD-10-CM

## 2021-12-10 DIAGNOSIS — L20.84 INTRINSIC ECZEMA: ICD-10-CM

## 2021-12-10 DIAGNOSIS — Z83.438 FAMILY HISTORY OF HYPERLIPIDEMIA: ICD-10-CM

## 2021-12-10 DIAGNOSIS — N84.1 POLYP OF CERVIX: ICD-10-CM

## 2021-12-10 DIAGNOSIS — J45.20 MILD INTERMITTENT ASTHMA WITHOUT COMPLICATION: ICD-10-CM

## 2021-12-10 DIAGNOSIS — Z00.00 ROUTINE GENERAL MEDICAL EXAMINATION AT A HEALTH CARE FACILITY: Primary | ICD-10-CM

## 2021-12-10 DIAGNOSIS — Z91.09 ENVIRONMENTAL ALLERGIES: ICD-10-CM

## 2021-12-10 DIAGNOSIS — Z11.59 NEED FOR HEPATITIS C SCREENING TEST: ICD-10-CM

## 2021-12-10 PROBLEM — L30.9 ECZEMA: Status: ACTIVE | Noted: 2021-12-10

## 2021-12-10 PROBLEM — D64.9 ANEMIA: Status: ACTIVE | Noted: 2021-01-19

## 2021-12-10 PROBLEM — J45.909 ASTHMA: Status: ACTIVE | Noted: 2021-01-19

## 2021-12-10 PROBLEM — M41.9 SCOLIOSIS DEFORMITY OF SPINE: Status: ACTIVE | Noted: 2021-01-19

## 2021-12-10 LAB
CHOLEST SERPL-MCNC: 173 MG/DL
FASTING STATUS PATIENT QL REPORTED: NO
HDLC SERPL-MCNC: 90 MG/DL
LDLC SERPL CALC-MCNC: 77 MG/DL
TRIGL SERPL-MCNC: 31 MG/DL

## 2021-12-10 PROCEDURE — 80061 LIPID PANEL: CPT | Performed by: FAMILY MEDICINE

## 2021-12-10 PROCEDURE — 86803 HEPATITIS C AB TEST: CPT | Performed by: FAMILY MEDICINE

## 2021-12-10 PROCEDURE — 90732 PPSV23 VACC 2 YRS+ SUBQ/IM: CPT | Performed by: FAMILY MEDICINE

## 2021-12-10 PROCEDURE — 90471 IMMUNIZATION ADMIN: CPT | Performed by: FAMILY MEDICINE

## 2021-12-10 PROCEDURE — 36415 COLL VENOUS BLD VENIPUNCTURE: CPT | Performed by: FAMILY MEDICINE

## 2021-12-10 PROCEDURE — 99385 PREV VISIT NEW AGE 18-39: CPT | Mod: 25 | Performed by: FAMILY MEDICINE

## 2021-12-10 RX ORDER — ALBUTEROL SULFATE 90 UG/1
2 AEROSOL, METERED RESPIRATORY (INHALATION) EVERY 6 HOURS
Qty: 18 G | Refills: 0 | Status: SHIPPED | OUTPATIENT
Start: 2021-12-10 | End: 2023-01-13

## 2021-12-10 RX ORDER — HYDROXYZINE PAMOATE 50 MG/1
CAPSULE ORAL
COMMUNITY
Start: 2021-08-26 | End: 2021-12-10

## 2021-12-10 RX ORDER — NORGESTREL-ETHINYL ESTRADIOL 0.3-0.03MG
1 TABLET ORAL
COMMUNITY
Start: 2020-08-14 | End: 2021-12-10

## 2021-12-10 RX ORDER — VALACYCLOVIR HYDROCHLORIDE 500 MG/1
TABLET, FILM COATED ORAL
COMMUNITY
Start: 2021-08-28 | End: 2021-12-10

## 2021-12-10 RX ORDER — TRIAMCINOLONE ACETONIDE 1 MG/G
OINTMENT TOPICAL
COMMUNITY
End: 2021-12-10

## 2021-12-10 ASSESSMENT — ASTHMA QUESTIONNAIRES
QUESTION_1 LAST FOUR WEEKS HOW MUCH OF THE TIME DID YOUR ASTHMA KEEP YOU FROM GETTING AS MUCH DONE AT WORK, SCHOOL OR AT HOME: NONE OF THE TIME
QUESTION_4 LAST FOUR WEEKS HOW OFTEN HAVE YOU USED YOUR RESCUE INHALER OR NEBULIZER MEDICATION (SUCH AS ALBUTEROL): NOT AT ALL
QUESTION_3 LAST FOUR WEEKS HOW OFTEN DID YOUR ASTHMA SYMPTOMS (WHEEZING, COUGHING, SHORTNESS OF BREATH, CHEST TIGHTNESS OR PAIN) WAKE YOU UP AT NIGHT OR EARLIER THAN USUAL IN THE MORNING: NOT AT ALL
ACT_TOTALSCORE: 25
QUESTION_2 LAST FOUR WEEKS HOW OFTEN HAVE YOU HAD SHORTNESS OF BREATH: NOT AT ALL
QUESTION_5 LAST FOUR WEEKS HOW WOULD YOU RATE YOUR ASTHMA CONTROL: COMPLETELY CONTROLLED

## 2021-12-10 ASSESSMENT — MIFFLIN-ST. JEOR: SCORE: 1418.29

## 2021-12-10 NOTE — LETTER
My Asthma Action Plan    Name: Sarah Hatfield   YOB: 1985  Date: 12/10/2021   My doctor: Jennifer Vásquez MD   My clinic: [facility]        My Rescue Medicine:   Albuterol inhaler (Proair/Ventolin/Proventil HFA)  2-4 puffs EVERY 4 HOURS as needed. Use a spacer if recommended by your provider.   My Asthma Severity:   Intermittent / Exercise Induced  Know your asthma triggers: exercise or sports             GREEN ZONE   Good Control    I feel good    No cough or wheeze    Can work, sleep and play without asthma symptoms       Take your asthma control medicine every day.     1. If exercise triggers your asthma, take your rescue medication    15 minutes before exercise or sports, and    During exercise if you have asthma symptoms  2. Spacer to use with inhaler: If you have a spacer, make sure to use it with your inhaler             YELLOW ZONE Getting Worse  I have ANY of these:    I do not feel good    Cough or wheeze    Chest feels tight    Wake up at night   1. Keep taking your Green Zone medications  2. Start taking your rescue medicine:    every 20 minutes for up to 1 hour. Then every 4 hours for 24-48 hours.  3. If you stay in the Yellow Zone for more than 12-24 hours, contact your doctor.  4. If you do not return to the Green Zone in 12-24 hours or you get worse, start taking your oral steroid medicine if prescribed by your provider.           RED ZONE Medical Alert - Get Help  I have ANY of these:    I feel awful    Medicine is not helping    Breathing getting harder    Trouble walking or talking    Nose opens wide to breathe       1. Take your rescue medicine NOW  2. If your provider has prescribed an oral steroid medicine, start taking it NOW  3. Call your doctor NOW  4. If you are still in the Red Zone after 20 minutes and you have not reached your doctor:    Take your rescue medicine again and    Call 911 or go to the emergency room right away    See your regular  doctor within 2 weeks of an Emergency Room or Urgent Care visit for follow-up treatment.          Annual Reminders:  Meet with Asthma Educator,  Flu Shot in the Fall, consider Pneumonia Vaccination for patients with asthma (aged 19 and older).    Pharmacy: [id number] - [place], [place] - [address] AT [facility]    Electronically signed by Jennifer Vásquez MD   Date: 12/10/21                    Asthma Triggers  How To Control Things That Make Your Asthma Worse    Triggers are things that make your asthma worse.  Look at the list below to help you find your triggers and   what you can do about them. You can help prevent asthma flare-ups by staying away from your triggers.      Trigger                                                          What you can do   Cigarette Smoke  Tobacco smoke can make asthma worse. Do not allow smoking in your home, car or around you.  Be sure no one smokes at a child s day care or school.  If you smoke, ask your health care provider for ways to help you quit.  Ask family members to quit too.  Ask your health care provider for a referral to Quit Plan to help you quit smoking, or call 5-792-164-PLAN.     Colds, Flu, Bronchitis  These are common triggers of asthma. Wash your hands often.  Don t touch your eyes, nose or mouth.  Get a flu shot every year.     Dust Mites  These are tiny bugs that live in cloth or carpet. They are too small to see. Wash sheets and blankets in hot water every week.   Encase pillows and mattress in dust mite proof covers.  Avoid having carpet if you can. If you have carpet, vacuum weekly.   Use a dust mask and HEPA vacuum.   Pollen and Outdoor Mold  Some people are allergic to trees, grass, or weed pollen, or molds. Try to keep your windows closed.  Limit time out doors when pollen count is high.   Ask you health care provider about taking medicine during allergy season.     Animal Dander  Some people are allergic to  skin flakes, urine or saliva from pets with fur or feathers. Keep pets with fur or feathers out of your home.    If you can t keep the pet outdoors, then keep the pet out of your bedroom.  Keep the bedroom door closed.  Keep pets off cloth furniture and away from stuffed toys.     Mice, Rats, and Cockroaches  Some people are allergic to the waste from these pests.   Cover food and garbage.  Clean up spills and food crumbs.  Store grease in the refrigerator.   Keep food out of the bedroom.   Indoor Mold  This can be a trigger if your home has high moisture. Fix leaking faucets, pipes, or other sources of water.   Clean moldy surfaces.  Dehumidify basement if it is damp and smelly.   Smoke, Strong Odors, and Sprays  These can reduce air quality. Stay away from strong odors and sprays, such as perfume, powder, hair spray, paints, smoke incense, paint, cleaning products, candles and new carpet.   Exercise or Sports  Some people with asthma have this trigger. Be active!  Ask your doctor about taking medicine before sports or exercise to prevent symptoms.    Warm up for 5-10 minutes before and after sports or exercise.     Other Triggers of Asthma  Cold air:  Cover your nose and mouth with a scarf.  Sometimes laughing or crying can be a trigger.  Some medicines and food can trigger asthma.
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
## 2021-12-11 ASSESSMENT — ASTHMA QUESTIONNAIRES: ACT_TOTALSCORE: 25

## 2021-12-13 LAB — HCV AB SERPL QL IA: NONREACTIVE

## 2021-12-14 ENCOUNTER — TELEPHONE (OUTPATIENT)
Dept: FAMILY MEDICINE | Facility: CLINIC | Age: 36
End: 2021-12-14
Payer: COMMERCIAL

## 2021-12-14 NOTE — TELEPHONE ENCOUNTER
Left  for [facility] Medical Records, gave her my direct line. If they call back, we are needing the records below. #1

## 2021-12-15 NOTE — TELEPHONE ENCOUNTER
"Called [facility] at number [phone]. Left detailed message #2 requesting records below to be faxed to fax number [phone].  \" Okay to relay message\"      "

## 2022-01-10 ENCOUNTER — MEDICAL CORRESPONDENCE (OUTPATIENT)
Dept: HEALTH INFORMATION MANAGEMENT | Facility: CLINIC | Age: 37
End: 2022-01-10
Payer: COMMERCIAL

## 2022-03-28 ENCOUNTER — MEDICAL CORRESPONDENCE (OUTPATIENT)
Dept: HEALTH INFORMATION MANAGEMENT | Facility: CLINIC | Age: 37
End: 2022-03-28
Payer: COMMERCIAL

## 2022-05-25 ENCOUNTER — LAB (OUTPATIENT)
Dept: FAMILY MEDICINE | Facility: CLINIC | Age: 37
End: 2022-05-25
Attending: FAMILY MEDICINE
Payer: COMMERCIAL

## 2022-05-25 DIAGNOSIS — Z20.822 SUSPECTED 2019 NOVEL CORONAVIRUS INFECTION: ICD-10-CM

## 2022-05-25 LAB — SARS-COV-2 RNA RESP QL NAA+PROBE: NEGATIVE

## 2022-05-25 PROCEDURE — U0005 INFEC AGEN DETEC AMPLI PROBE: HCPCS

## 2022-05-25 PROCEDURE — U0003 INFECTIOUS AGENT DETECTION BY NUCLEIC ACID (DNA OR RNA); SEVERE ACUTE RESPIRATORY SYNDROME CORONAVIRUS 2 (SARS-COV-2) (CORONAVIRUS DISEASE [COVID-19]), AMPLIFIED PROBE TECHNIQUE, MAKING USE OF HIGH THROUGHPUT TECHNOLOGIES AS DESCRIBED BY CMS-2020-01-R: HCPCS

## 2022-05-26 ENCOUNTER — TELEPHONE (OUTPATIENT)
Dept: FAMILY MEDICINE | Facility: CLINIC | Age: 37
End: 2022-05-26
Payer: COMMERCIAL

## 2022-05-26 NOTE — TELEPHONE ENCOUNTER
RN received a call from patient regarding her COVID-19 concern.      Patient's symptoms started on 5/24/2022 with runny nose and sore throat. Then, she developed cough , fever, and chills.       Patient took COVID-19 home test on Tuesday and was negative. Patient also went to [facility] and the result is still negative.      Patient's main concerns are chest congestion in the evening and has been taking Tylenol PM.       RN offered patient to be seen virtually with Dr. Vásquez to discuss her concern.     Patient verbalized understanding and agreed with the plan.      Appointments in Next   May 26, 2022  5:00 PM  (Arrive by 4:40 PM)  Provider Visit with Jennifer Vásquez MD  [facility] ([facility] ) [phone]            India Hendricks RN  [facility]

## 2022-06-01 ENCOUNTER — LAB (OUTPATIENT)
Dept: FAMILY MEDICINE | Facility: CLINIC | Age: 37
End: 2022-06-01
Attending: FAMILY MEDICINE
Payer: COMMERCIAL

## 2022-06-01 DIAGNOSIS — Z20.822 SUSPECTED 2019 NOVEL CORONAVIRUS INFECTION: ICD-10-CM

## 2022-06-01 LAB — SARS-COV-2 RNA RESP QL NAA+PROBE: NEGATIVE

## 2022-06-01 PROCEDURE — U0003 INFECTIOUS AGENT DETECTION BY NUCLEIC ACID (DNA OR RNA); SEVERE ACUTE RESPIRATORY SYNDROME CORONAVIRUS 2 (SARS-COV-2) (CORONAVIRUS DISEASE [COVID-19]), AMPLIFIED PROBE TECHNIQUE, MAKING USE OF HIGH THROUGHPUT TECHNOLOGIES AS DESCRIBED BY CMS-2020-01-R: HCPCS

## 2022-06-01 PROCEDURE — U0005 INFEC AGEN DETEC AMPLI PROBE: HCPCS

## 2022-06-02 ENCOUNTER — TELEPHONE (OUTPATIENT)
Dept: FAMILY MEDICINE | Facility: CLINIC | Age: 37
End: 2022-06-02
Payer: COMMERCIAL

## 2022-06-02 DIAGNOSIS — Z20.822 SUSPECTED COVID-19 VIRUS INFECTION: Primary | ICD-10-CM

## 2022-06-02 NOTE — PATIENT INSTRUCTIONS

## 2022-06-02 NOTE — TELEPHONE ENCOUNTER
I tried to call her back, but got voicemail. I left a message for her.  I put in orders for Covid PCR and strep test for her. The home tests can sometimes miss Covid infection.

## 2022-06-02 NOTE — TELEPHONE ENCOUNTER
"RN took incoming call from pt. Per pt, she has been sick since last Tuesday with flu-like symptoms.) Pt had a wet cough and sore throat. Her body felt like she was hit \"by a truck.\" Everything felt like a head cold. The past 7 days, pt had cold night sweats, which was unusual for pt. Pt did a COVID test, was negative. Pt isolated until Friday. Symptoms improved slightly. Now pt is just having a dry cough.    Pt did another COVID test yesterday--negative.    Pt's mother was with pt all this time. At pt's mother recent doctor's appt, she was diagnosed with \"pneumonia.\" Pt is worried about her son having the same thing. Pt's son was coughing for 2 hr straight yesterday (Separate TE created and sent).    RN tried assisting in making in clinic appt, but no available appt today and tomorrow. Pt wondering if need to be seen, and any recommendations from PCP.    RN routing to PCP to review and advise.    JAILENE PeresN, RN   [facility]                  "

## 2022-06-16 ENCOUNTER — OFFICE VISIT (OUTPATIENT)
Dept: FAMILY MEDICINE | Facility: CLINIC | Age: 37
End: 2022-06-16
Payer: COMMERCIAL

## 2022-06-16 VITALS
HEART RATE: 60 BPM | WEIGHT: 139 LBS | DIASTOLIC BLOOD PRESSURE: 56 MMHG | SYSTOLIC BLOOD PRESSURE: 86 MMHG | BODY MASS INDEX: 20.83 KG/M2 | OXYGEN SATURATION: 100 %

## 2022-06-16 DIAGNOSIS — L50.9 HIVES: Primary | ICD-10-CM

## 2022-06-16 LAB
BASOPHILS # BLD AUTO: 0 10E3/UL (ref 0–0.2)
BASOPHILS NFR BLD AUTO: 0 %
EOSINOPHIL # BLD AUTO: 0 10E3/UL (ref 0–0.7)
EOSINOPHIL NFR BLD AUTO: 0 %
ERYTHROCYTE [DISTWIDTH] IN BLOOD BY AUTOMATED COUNT: 12.5 % (ref 10–15)
ERYTHROCYTE [SEDIMENTATION RATE] IN BLOOD BY WESTERGREN METHOD: 16 MM/HR (ref 0–20)
HCT VFR BLD AUTO: 39.4 % (ref 35–47)
HGB BLD-MCNC: 13.3 G/DL (ref 11.7–15.7)
IMM GRANULOCYTES # BLD: 0 10E3/UL
IMM GRANULOCYTES NFR BLD: 0 %
LYMPHOCYTES # BLD AUTO: 3 10E3/UL (ref 0.8–5.3)
LYMPHOCYTES NFR BLD AUTO: 51 %
MCH RBC QN AUTO: 29.4 PG (ref 26.5–33)
MCHC RBC AUTO-ENTMCNC: 33.8 G/DL (ref 31.5–36.5)
MCV RBC AUTO: 87 FL (ref 78–100)
MONOCYTES # BLD AUTO: 0.7 10E3/UL (ref 0–1.3)
MONOCYTES NFR BLD AUTO: 12 %
NEUTROPHILS # BLD AUTO: 2.2 10E3/UL (ref 1.6–8.3)
NEUTROPHILS NFR BLD AUTO: 37 %
PLATELET # BLD AUTO: 297 10E3/UL (ref 150–450)
RBC # BLD AUTO: 4.53 10E6/UL (ref 3.8–5.2)
TSH SERPL DL<=0.005 MIU/L-ACNC: 1.28 UIU/ML (ref 0.3–5)
WBC # BLD AUTO: 5.9 10E3/UL (ref 4–11)

## 2022-06-16 PROCEDURE — 86160 COMPLEMENT ANTIGEN: CPT | Mod: 59 | Performed by: FAMILY MEDICINE

## 2022-06-16 PROCEDURE — 86160 COMPLEMENT ANTIGEN: CPT | Performed by: FAMILY MEDICINE

## 2022-06-16 PROCEDURE — 36415 COLL VENOUS BLD VENIPUNCTURE: CPT | Performed by: FAMILY MEDICINE

## 2022-06-16 PROCEDURE — 99214 OFFICE O/P EST MOD 30 MIN: CPT | Performed by: FAMILY MEDICINE

## 2022-06-16 PROCEDURE — 85025 COMPLETE CBC W/AUTO DIFF WBC: CPT | Performed by: FAMILY MEDICINE

## 2022-06-16 PROCEDURE — 84443 ASSAY THYROID STIM HORMONE: CPT | Performed by: FAMILY MEDICINE

## 2022-06-16 PROCEDURE — 86140 C-REACTIVE PROTEIN: CPT | Performed by: FAMILY MEDICINE

## 2022-06-16 PROCEDURE — 85652 RBC SED RATE AUTOMATED: CPT | Performed by: FAMILY MEDICINE

## 2022-06-16 RX ORDER — PREDNISONE 20 MG/1
40 TABLET ORAL DAILY
Qty: 10 TABLET | Refills: 0 | Status: SHIPPED | OUTPATIENT
Start: 2022-06-16 | End: 2022-06-21

## 2022-06-16 RX ORDER — FAMOTIDINE 20 MG/1
20 TABLET, FILM COATED ORAL 2 TIMES DAILY PRN
Qty: 60 TABLET | Refills: 1 | Status: SHIPPED | OUTPATIENT
Start: 2022-06-16 | End: 2023-01-13

## 2022-06-16 RX ORDER — DOXEPIN HYDROCHLORIDE 10 MG/1
10 CAPSULE ORAL
Qty: 30 CAPSULE | Refills: 1 | Status: SHIPPED | OUTPATIENT
Start: 2022-06-16 | End: 2023-01-13

## 2022-06-16 ASSESSMENT — ASTHMA QUESTIONNAIRES
QUESTION_2 LAST FOUR WEEKS HOW OFTEN HAVE YOU HAD SHORTNESS OF BREATH: ONCE OR TWICE A WEEK
QUESTION_4 LAST FOUR WEEKS HOW OFTEN HAVE YOU USED YOUR RESCUE INHALER OR NEBULIZER MEDICATION (SUCH AS ALBUTEROL): NOT AT ALL
QUESTION_5 LAST FOUR WEEKS HOW WOULD YOU RATE YOUR ASTHMA CONTROL: COMPLETELY CONTROLLED
ACT_TOTALSCORE: 24
QUESTION_1 LAST FOUR WEEKS HOW MUCH OF THE TIME DID YOUR ASTHMA KEEP YOU FROM GETTING AS MUCH DONE AT WORK, SCHOOL OR AT HOME: NONE OF THE TIME
QUESTION_3 LAST FOUR WEEKS HOW OFTEN DID YOUR ASTHMA SYMPTOMS (WHEEZING, COUGHING, SHORTNESS OF BREATH, CHEST TIGHTNESS OR PAIN) WAKE YOU UP AT NIGHT OR EARLIER THAN USUAL IN THE MORNING: NOT AT ALL
ACT_TOTALSCORE: 24

## 2022-06-16 ASSESSMENT — PATIENT HEALTH QUESTIONNAIRE - PHQ9
SUM OF ALL RESPONSES TO PHQ QUESTIONS 1-9: 0
10. IF YOU CHECKED OFF ANY PROBLEMS, HOW DIFFICULT HAVE THESE PROBLEMS MADE IT FOR YOU TO DO YOUR WORK, TAKE CARE OF THINGS AT HOME, OR GET ALONG WITH OTHER PEOPLE: NOT DIFFICULT AT ALL
SUM OF ALL RESPONSES TO PHQ QUESTIONS 1-9: 0

## 2022-06-16 ASSESSMENT — ENCOUNTER SYMPTOMS: COUGH: 1

## 2022-06-16 NOTE — PROGRESS NOTES
[facility] IN-OFFICE Visit  Phone : None    Assessment/Plan:  Hives  Improving with strict diet pt has adjusted herself.  Recommend pepcid bid, trial of doxpein for hs symptoms and prednisone course x 5 days to help with symptoms over upcoming travel.  Labs as below to look for underyling issues.  Follow-up with allergy due to nature and reoccurrence of symptoms.    - Adult Allergy/Asthma Referral  - famotidine (PEPCID) 20 MG tablet  Dispense: 60 tablet; Refill: 1  - doxepin (SINEQUAN) 10 MG capsule  Dispense: 30 capsule; Refill: 1  - predniSONE (DELTASONE) 20 MG tablet  Dispense: 10 tablet; Refill: 0  - CBC with platelets and differential  - CRP, inflammation  - ESR: Erythrocyte sedimentation rate  - TSH with free T4 reflex  - Complement C4  - Complement C3  - CBC with platelets and differential  - CRP, inflammation  - ESR: Erythrocyte sedimentation rate  - TSH with free T4 reflex  - Complement C4  - Complement C3    Patient is a currently breast-feeding mother  Careful with meds.       No follow-ups on file.    Bruce Smith is a 36 year old, presenting for the following health issues:  Cough and Hives    Mom came to visit her and her 9mo old son and about 5 days after she arrived both she and her mom got a bad URI starting around 5/29 and did 2 neg PCR tests for covid.  Mom did develop pneumonia and treated with antibiotics and still recovering.  Has h/o asthma but did not need inhaler during this episode because not having wheezing or SOB.  Better but has a persistent productive cough- worse in the morning.      Starting night of 6/12 developed full body hives.  Has had bouts of hives since 10 year old- sometimes possibly related to meds.  2005 had hives for 2 weeks that spontaneously resolved.  2011 while living in [facility] had hives on and off for just under a year that resolved upon moving.      Taking antihistamine hydroxyzine left over from her pregnancy 50mg at hs  to help with hives and itching at night.  Currently doing a 24hr juice cleanse for a low histamine diet and nothing for past 2 days.  Trying to do more exercise and using standing desk, etc. pushing fluids. Wearing loose clothing, etc.  Cold showers for symptom relief.      Legs and torso have normal light colored skin with darker patches/bruising appearance.     Doesn't like the mood effects of hydroxyzine- easily overwhelmed.   Benadryl tends to be a stimulant  Takes cetirizine daily until she started hydroxyzine    No family h/o hives.  Mom and sister have thyroid issues     Cough    History of Present Illness       Reason for visit:  Hives and a lingering cough  Symptom onset:  3-4 weeks ago    She eats 4 or more servings of fruits and vegetables daily.She consumes 1 sweetened beverage(s) daily.She exercises with enough effort to increase her heart rate 20 to 29 minutes per day.  She exercises with enough effort to increase her heart rate 5 days per week.   She is taking medications regularly.    Today's PHQ-9         PHQ-9 Total Score: 0    PHQ-9 Q9 Thoughts of better off dead/self-harm past 2 weeks :   Not at all    How difficult have these problems made it for you to do your work, take care of things at home, or get along with other people: Not difficult at all      Review of Systems   Respiratory: Positive for cough.           Objective    BP (!) 86/56 (BP Location: Right arm, Patient Position: Sitting, Cuff Size: Adult Regular)   Pulse 60   Wt 63 kg (139 lb)   SpO2 100%   BMI 20.83 kg/m    Body mass index is 20.83 kg/m .  Physical Exam   Wt Readings from Last 3 Encounters:   06/16/22 63 kg (139 lb)   12/10/21 66.7 kg (147 lb)   09/11/21 79.4 kg (175 lb)       No LMP recorded. (Menstrual status: IUD).    All normal as below except abnormalities include: occ cough- able to talk clearly.  Normal lung sounds- no wheezing or crackles.  Clear.  Mild/faded hives scattered on body with some itching during visit  today.  No swelling of face or mouth.    General is a  36 year old female sitting comfortably in no apparent distress.   HEENT:  TM are clear bilaterally.  Eye, nasal, oral exams within normal   Neck: Supple without lymphadenopathy or thyromegally  CV: Regular rate and rhythm S1S2 without rubs, murmurs or gallops,   Lungs: Clear to auscultation bilaterally  Abd:  +BS, soft NT/ND,  No masses or organomegally,   Extremities: Warm, No Edema, 2+ Pedal and radial pulses bilaterally  Neuro: Able to ambulate around the exam room with equal movement, strength and normal coordination of the upper and lower extremeties symmetrically    Old Records-1: Outside allergies, meds, problems and immunizations were reconciled as needed from [facility]  Lab tests reviewed-1: Hgb 11.4 2021      Bre Marx MD          .  ..

## 2022-06-16 NOTE — PATIENT INSTRUCTIONS
Keep taking cetirizine 10mg daily  Okay to add pepcid 20mg twice daily   Doxepin at bedtime as needed    Consider adding prednisone if things get worse    Consider adding singulair

## 2022-06-17 ENCOUNTER — MYC MEDICAL ADVICE (OUTPATIENT)
Dept: FAMILY MEDICINE | Facility: CLINIC | Age: 37
End: 2022-06-17
Payer: COMMERCIAL

## 2022-06-17 DIAGNOSIS — L50.9 URTICARIA: Primary | ICD-10-CM

## 2022-06-17 LAB
C REACTIVE PROTEIN LHE: 0.1 MG/DL (ref 0–0.8)
C3 SERPL-MCNC: 72 MG/DL (ref 83–177)
C4 SERPL-MCNC: 19 MG/DL (ref 19–59)

## 2022-06-17 NOTE — TELEPHONE ENCOUNTER
RN attempted to contact patient, but no answer. Left message on patient's voice mail to call clinic back.    Kadi Pena RN  [facility]           Component Ref Range & Units 1 d ago     C3 Complement 83 - 177 mg/dL 72 Low           Dear Sarah,      Your results are in MyChart.      Overall your labs look okay- nothing obvious contributing to your hives.       Hope you continue to feel better and get in with allergy soon     Please let us know if you have any questions or concerns.      Bre Marx MD   Written by Bre Marx MD on 6/17/2022  9:46 AM CDT  Seen by patient Sarah Hatfield on 6/17/2022  9:59 AM

## 2022-06-20 NOTE — TELEPHONE ENCOUNTER
See My Chart message below  RN made 2nd attempt to contact patient, but no answer. Left message on patient's voice mail to call clinic back.    Kadi Pena RN  [facility]

## 2022-06-21 RX ORDER — HYDROXYZINE PAMOATE 25 MG/1
25-50 CAPSULE ORAL 3 TIMES DAILY PRN
Qty: 50 CAPSULE | Refills: 1 | Status: SHIPPED | OUTPATIENT
Start: 2022-06-21 | End: 2023-01-13

## 2022-06-21 RX ORDER — HYDROXYZINE PAMOATE 50 MG/1
CAPSULE ORAL
Qty: 180 CAPSULE | Refills: 1 | Status: CANCELLED | OUTPATIENT
Start: 2022-06-21

## 2022-06-21 NOTE — TELEPHONE ENCOUNTER
Rx refilled for hydroxyzine. I use 25-50 mg three times daily as needed.   Sarah was seen today for results and medication question.    Diagnoses and all orders for this visit:    Urticaria  -     hydrOXYzine (VISTARIL) 25 MG capsule; Take 1-2 capsules (25-50 mg) by mouth 3 times daily as needed for itching (insomnia)

## 2022-06-21 NOTE — TELEPHONE ENCOUNTER
Contacted patient and she received her results letter.  Patient also wondering if PCP, Dr Vásquez would prescribe hydroxyzine which was previously prescribed by patient's OB / GYN? Patient has no refills left.  Contacted [facility] for correct dosing and directions.  Medication pended.  Hydroxyzine 50 mg Take 1 -2 capsules by mouth at bedtime for sleep and itching    Message routed to Dr Vásquez for review / plan    Kadi Pena RN  [facility]

## 2022-06-21 NOTE — TELEPHONE ENCOUNTER
Contacted patient and relayed that Rx for hydroxyzine was refilled    No further action needed.    Kadi Pena RN  [facility]

## 2022-10-03 ENCOUNTER — HEALTH MAINTENANCE LETTER (OUTPATIENT)
Age: 37
End: 2022-10-03

## 2023-01-12 ENCOUNTER — E-VISIT (OUTPATIENT)
Dept: FAMILY MEDICINE | Facility: CLINIC | Age: 38
End: 2023-01-12
Payer: COMMERCIAL

## 2023-01-12 ENCOUNTER — MEDICAL CORRESPONDENCE (OUTPATIENT)
Dept: HEALTH INFORMATION MANAGEMENT | Facility: CLINIC | Age: 38
End: 2023-01-12

## 2023-01-12 DIAGNOSIS — J06.9 VIRAL URI WITH COUGH: Primary | ICD-10-CM

## 2023-01-12 DIAGNOSIS — J45.20 MILD INTERMITTENT ASTHMA WITHOUT COMPLICATION: ICD-10-CM

## 2023-01-12 PROCEDURE — 99421 OL DIG E/M SVC 5-10 MIN: CPT | Performed by: FAMILY MEDICINE

## 2023-01-13 PROBLEM — M41.9 SCOLIOSIS DEFORMITY OF SPINE: Status: RESOLVED | Noted: 2021-01-19 | Resolved: 2023-01-13

## 2023-01-13 RX ORDER — ALBUTEROL SULFATE 90 UG/1
2 AEROSOL, METERED RESPIRATORY (INHALATION) EVERY 6 HOURS PRN
Qty: 18 G | Refills: 0 | Status: SHIPPED | OUTPATIENT
Start: 2023-01-13

## 2023-01-13 RX ORDER — GUAIFENESIN/DEXTROMETHORPHAN 100-10MG/5
10 SYRUP ORAL EVERY 4 HOURS PRN
Qty: 236 ML | Refills: 1 | Status: SHIPPED | OUTPATIENT
Start: 2023-01-13

## 2023-01-13 RX ORDER — IBUPROFEN 600 MG/1
600 TABLET, FILM COATED ORAL EVERY 6 HOURS PRN
Qty: 50 TABLET | Refills: 0 | Status: ON HOLD | OUTPATIENT
Start: 2023-01-13 | End: 2024-07-26

## 2023-01-13 NOTE — PATIENT INSTRUCTIONS
Dear Sarah Hatfield    Your symptoms sound like a viral infection.  There are number of things going around right now, including COVID, influenza, RSV, and a smattering of other viruses.  With your history of asthma, I think it is reasonable to refill your albuterol inhaler.  I like Robitussin-DM and honey for cough.  If you are not getting better with these treatments, we could send in some prednisone to help with an asthma exacerbation.  Let us know, if that would be helpful.    If you think that is helpful, I put in a referral for a COVID test.  There is no benefit to doing a flu test at this point because the antiviral medications for influenza do not work if your symptoms have been going on for more than a couple of days.    Thanks for choosing us as your health care partner,    Jennifer Vásquez MD

## 2023-02-11 ENCOUNTER — HEALTH MAINTENANCE LETTER (OUTPATIENT)
Age: 38
End: 2023-02-11

## 2024-01-29 ENCOUNTER — LAB REQUISITION (OUTPATIENT)
Dept: LAB | Facility: CLINIC | Age: 39
End: 2024-01-29
Payer: COMMERCIAL

## 2024-01-29 DIAGNOSIS — Z34.92 ENCOUNTER FOR SUPERVISION OF NORMAL PREGNANCY, UNSPECIFIED, SECOND TRIMESTER: ICD-10-CM

## 2024-01-29 LAB
ABO (EXTERNAL): NORMAL
BASOPHILS # BLD AUTO: 0 10E3/UL (ref 0–0.2)
BASOPHILS NFR BLD AUTO: 0 %
EOSINOPHIL # BLD AUTO: 0 10E3/UL (ref 0–0.7)
EOSINOPHIL NFR BLD AUTO: 0 %
ERYTHROCYTE [DISTWIDTH] IN BLOOD BY AUTOMATED COUNT: 13.6 % (ref 10–15)
HCT VFR BLD AUTO: 35.1 % (ref 35–47)
HEMOGLOBIN (EXTERNAL): 12 G/DL (ref 11.7–15.7)
HEPATITIS B SURFACE ANTIGEN (EXTERNAL): NONREACTIVE
HGB BLD-MCNC: 12 G/DL (ref 11.7–15.7)
HIV 1+2 AB+HIV1 P24 AG SERPL QL IA: NONREACTIVE
HIV1+2 AB SERPL QL IA: NONREACTIVE
IMM GRANULOCYTES # BLD: 0 10E3/UL
IMM GRANULOCYTES NFR BLD: 0 %
LYMPHOCYTES # BLD AUTO: 2.2 10E3/UL (ref 0.8–5.3)
LYMPHOCYTES NFR BLD AUTO: 29 %
MCH RBC QN AUTO: 30.6 PG (ref 26.5–33)
MCHC RBC AUTO-ENTMCNC: 34.2 G/DL (ref 31.5–36.5)
MCV RBC AUTO: 90 FL (ref 78–100)
MONOCYTES # BLD AUTO: 0.7 10E3/UL (ref 0–1.3)
MONOCYTES NFR BLD AUTO: 9 %
NEUTROPHILS # BLD AUTO: 4.6 10E3/UL (ref 1.6–8.3)
NEUTROPHILS NFR BLD AUTO: 62 %
NRBC # BLD AUTO: 0 10E3/UL
NRBC BLD AUTO-RTO: 0 /100
PLATELET # BLD AUTO: 271 10E3/UL (ref 150–450)
PLATELET COUNT (EXTERNAL): 271 10E3/UL (ref 140–400)
RBC # BLD AUTO: 3.92 10E6/UL (ref 3.8–5.2)
RH (EXTERNAL): POSITIVE
RUBELLA ANTIBODY IGG (EXTERNAL): NORMAL
VDRL (SYPHILIS) (EXTERNAL): NONREACTIVE
WBC # BLD AUTO: 7.5 10E3/UL (ref 4–11)

## 2024-01-29 PROCEDURE — 86592 SYPHILIS TEST NON-TREP QUAL: CPT | Mod: ORL | Performed by: OBSTETRICS & GYNECOLOGY

## 2024-01-29 PROCEDURE — 86900 BLOOD TYPING SEROLOGIC ABO: CPT | Mod: ORL | Performed by: OBSTETRICS & GYNECOLOGY

## 2024-01-29 PROCEDURE — 86762 RUBELLA ANTIBODY: CPT | Mod: ORL | Performed by: OBSTETRICS & GYNECOLOGY

## 2024-01-29 PROCEDURE — 86803 HEPATITIS C AB TEST: CPT | Mod: ORL | Performed by: OBSTETRICS & GYNECOLOGY

## 2024-01-29 PROCEDURE — 87389 HIV-1 AG W/HIV-1&-2 AB AG IA: CPT | Mod: ORL | Performed by: OBSTETRICS & GYNECOLOGY

## 2024-01-29 PROCEDURE — 87086 URINE CULTURE/COLONY COUNT: CPT | Mod: ORL | Performed by: OBSTETRICS & GYNECOLOGY

## 2024-01-29 PROCEDURE — 87491 CHLMYD TRACH DNA AMP PROBE: CPT | Mod: ORL | Performed by: OBSTETRICS & GYNECOLOGY

## 2024-01-29 PROCEDURE — 87340 HEPATITIS B SURFACE AG IA: CPT | Mod: ORL | Performed by: OBSTETRICS & GYNECOLOGY

## 2024-01-29 PROCEDURE — 85025 COMPLETE CBC W/AUTO DIFF WBC: CPT | Mod: ORL | Performed by: OBSTETRICS & GYNECOLOGY

## 2024-01-30 LAB
ABO/RH(D): NORMAL
ANTIBODY SCREEN: NEGATIVE
C TRACH DNA SPEC QL PROBE+SIG AMP: NEGATIVE
HBV SURFACE AG SERPL QL IA: NONREACTIVE
HCV AB SERPL QL IA: NONREACTIVE
N GONORRHOEA DNA SPEC QL NAA+PROBE: NEGATIVE
RPR SER QL: NONREACTIVE
RUBV IGG SERPL QL IA: 1.2 INDEX
RUBV IGG SERPL QL IA: POSITIVE
SPECIMEN EXPIRATION DATE: NORMAL

## 2024-01-31 LAB — BACTERIA UR CULT: NORMAL

## 2024-03-09 ENCOUNTER — HEALTH MAINTENANCE LETTER (OUTPATIENT)
Age: 39
End: 2024-03-09

## 2024-05-10 ENCOUNTER — LAB REQUISITION (OUTPATIENT)
Dept: LAB | Facility: CLINIC | Age: 39
End: 2024-05-10
Payer: COMMERCIAL

## 2024-05-10 DIAGNOSIS — Z11.3 ENCOUNTER FOR SCREENING FOR INFECTIONS WITH A PREDOMINANTLY SEXUAL MODE OF TRANSMISSION: ICD-10-CM

## 2024-05-10 PROCEDURE — 86780 TREPONEMA PALLIDUM: CPT | Mod: ORL | Performed by: NURSE PRACTITIONER

## 2024-05-11 LAB — T PALLIDUM AB SER QL: NONREACTIVE

## 2024-06-19 ENCOUNTER — LAB REQUISITION (OUTPATIENT)
Dept: LAB | Facility: CLINIC | Age: 39
End: 2024-06-19
Payer: COMMERCIAL

## 2024-06-19 DIAGNOSIS — Z23 ENCOUNTER FOR IMMUNIZATION: ICD-10-CM

## 2024-06-19 PROCEDURE — 87653 STREP B DNA AMP PROBE: CPT | Mod: ORL | Performed by: OBSTETRICS & GYNECOLOGY

## 2024-06-20 LAB — GP B STREP DNA SPEC QL NAA+PROBE: NEGATIVE

## 2024-06-21 LAB — GROUP B STREPTOCOCCUS (EXTERNAL): NEGATIVE

## 2024-07-25 ENCOUNTER — HOSPITAL ENCOUNTER (INPATIENT)
Facility: HOSPITAL | Age: 39
LOS: 1 days | Discharge: HOME | End: 2024-07-26
Attending: OBSTETRICS & GYNECOLOGY | Admitting: OBSTETRICS & GYNECOLOGY
Payer: COMMERCIAL

## 2024-07-25 DIAGNOSIS — J06.9 VIRAL URI WITH COUGH: ICD-10-CM

## 2024-07-25 PROBLEM — Z34.90 PREGNANCY: Status: ACTIVE | Noted: 2024-07-25

## 2024-07-25 LAB
ABO/RH(D): NORMAL
ANTIBODY SCREEN: NEGATIVE
HGB BLD-MCNC: 13.9 G/DL (ref 11.7–15.7)
SPECIMEN EXPIRATION DATE: NORMAL
T PALLIDUM AB SER QL: NONREACTIVE

## 2024-07-25 PROCEDURE — 250N000009 HC RX 250: Performed by: OBSTETRICS & GYNECOLOGY

## 2024-07-25 PROCEDURE — 120N000013 HC R&B IMCU

## 2024-07-25 PROCEDURE — 85018 HEMOGLOBIN: CPT | Performed by: OBSTETRICS & GYNECOLOGY

## 2024-07-25 PROCEDURE — 86780 TREPONEMA PALLIDUM: CPT | Performed by: OBSTETRICS & GYNECOLOGY

## 2024-07-25 PROCEDURE — 86900 BLOOD TYPING SEROLOGIC ABO: CPT | Performed by: OBSTETRICS & GYNECOLOGY

## 2024-07-25 PROCEDURE — 250N000013 HC RX MED GY IP 250 OP 250 PS 637: Performed by: OBSTETRICS & GYNECOLOGY

## 2024-07-25 PROCEDURE — 722N000001 HC LABOR CARE VAGINAL DELIVERY SINGLE

## 2024-07-25 PROCEDURE — 0KQM0ZZ REPAIR PERINEUM MUSCLE, OPEN APPROACH: ICD-10-PCS | Performed by: OBSTETRICS & GYNECOLOGY

## 2024-07-25 PROCEDURE — 36415 COLL VENOUS BLD VENIPUNCTURE: CPT | Performed by: OBSTETRICS & GYNECOLOGY

## 2024-07-25 RX ORDER — IBUPROFEN 800 MG/1
800 TABLET, FILM COATED ORAL EVERY 6 HOURS PRN
Status: DISCONTINUED | OUTPATIENT
Start: 2024-07-25 | End: 2024-07-26 | Stop reason: HOSPADM

## 2024-07-25 RX ORDER — CARBOPROST TROMETHAMINE 250 UG/ML
250 INJECTION, SOLUTION INTRAMUSCULAR
Status: DISCONTINUED | OUTPATIENT
Start: 2024-07-25 | End: 2024-07-25 | Stop reason: HOSPADM

## 2024-07-25 RX ORDER — MISOPROSTOL 200 UG/1
800 TABLET ORAL
Status: DISCONTINUED | OUTPATIENT
Start: 2024-07-25 | End: 2024-07-26 | Stop reason: HOSPADM

## 2024-07-25 RX ORDER — METHYLERGONOVINE MALEATE 0.2 MG/ML
200 INJECTION INTRAVENOUS
Status: DISCONTINUED | OUTPATIENT
Start: 2024-07-25 | End: 2024-07-25 | Stop reason: HOSPADM

## 2024-07-25 RX ORDER — SODIUM CHLORIDE, SODIUM LACTATE, POTASSIUM CHLORIDE, CALCIUM CHLORIDE 600; 310; 30; 20 MG/100ML; MG/100ML; MG/100ML; MG/100ML
INJECTION, SOLUTION INTRAVENOUS CONTINUOUS
Status: DISCONTINUED | OUTPATIENT
Start: 2024-07-25 | End: 2024-07-25 | Stop reason: HOSPADM

## 2024-07-25 RX ORDER — METOCLOPRAMIDE HYDROCHLORIDE 5 MG/ML
10 INJECTION INTRAMUSCULAR; INTRAVENOUS EVERY 6 HOURS PRN
Status: DISCONTINUED | OUTPATIENT
Start: 2024-07-25 | End: 2024-07-25 | Stop reason: HOSPADM

## 2024-07-25 RX ORDER — LOPERAMIDE HCL 2 MG
2 CAPSULE ORAL
Status: DISCONTINUED | OUTPATIENT
Start: 2024-07-25 | End: 2024-07-25 | Stop reason: HOSPADM

## 2024-07-25 RX ORDER — OXYTOCIN 10 [USP'U]/ML
10 INJECTION, SOLUTION INTRAMUSCULAR; INTRAVENOUS
Status: DISCONTINUED | OUTPATIENT
Start: 2024-07-25 | End: 2024-07-25 | Stop reason: HOSPADM

## 2024-07-25 RX ORDER — LOPERAMIDE HCL 2 MG
4 CAPSULE ORAL
Status: DISCONTINUED | OUTPATIENT
Start: 2024-07-25 | End: 2024-07-25 | Stop reason: HOSPADM

## 2024-07-25 RX ORDER — NALOXONE HYDROCHLORIDE 0.4 MG/ML
0.2 INJECTION, SOLUTION INTRAMUSCULAR; INTRAVENOUS; SUBCUTANEOUS
Status: DISCONTINUED | OUTPATIENT
Start: 2024-07-25 | End: 2024-07-25 | Stop reason: HOSPADM

## 2024-07-25 RX ORDER — PROCHLORPERAZINE MALEATE 10 MG
10 TABLET ORAL EVERY 6 HOURS PRN
Status: DISCONTINUED | OUTPATIENT
Start: 2024-07-25 | End: 2024-07-25 | Stop reason: HOSPADM

## 2024-07-25 RX ORDER — OXYTOCIN/0.9 % SODIUM CHLORIDE 30/500 ML
100-340 PLASTIC BAG, INJECTION (ML) INTRAVENOUS CONTINUOUS PRN
Status: DISCONTINUED | OUTPATIENT
Start: 2024-07-25 | End: 2024-07-26 | Stop reason: HOSPADM

## 2024-07-25 RX ORDER — OXYTOCIN/0.9 % SODIUM CHLORIDE 30/500 ML
340 PLASTIC BAG, INJECTION (ML) INTRAVENOUS CONTINUOUS PRN
Status: DISCONTINUED | OUTPATIENT
Start: 2024-07-25 | End: 2024-07-25 | Stop reason: HOSPADM

## 2024-07-25 RX ORDER — ONDANSETRON 4 MG/1
4 TABLET, ORALLY DISINTEGRATING ORAL EVERY 6 HOURS PRN
Status: DISCONTINUED | OUTPATIENT
Start: 2024-07-25 | End: 2024-07-25 | Stop reason: HOSPADM

## 2024-07-25 RX ORDER — BISACODYL 10 MG
10 SUPPOSITORY, RECTAL RECTAL DAILY PRN
Status: DISCONTINUED | OUTPATIENT
Start: 2024-07-25 | End: 2024-07-26 | Stop reason: HOSPADM

## 2024-07-25 RX ORDER — DOCUSATE SODIUM 100 MG/1
100 CAPSULE, LIQUID FILLED ORAL DAILY
Status: DISCONTINUED | OUTPATIENT
Start: 2024-07-25 | End: 2024-07-26 | Stop reason: HOSPADM

## 2024-07-25 RX ORDER — TRANEXAMIC ACID 10 MG/ML
1 INJECTION, SOLUTION INTRAVENOUS EVERY 30 MIN PRN
Status: DISCONTINUED | OUTPATIENT
Start: 2024-07-25 | End: 2024-07-26 | Stop reason: HOSPADM

## 2024-07-25 RX ORDER — OXYTOCIN/0.9 % SODIUM CHLORIDE 30/500 ML
340 PLASTIC BAG, INJECTION (ML) INTRAVENOUS CONTINUOUS PRN
Status: DISCONTINUED | OUTPATIENT
Start: 2024-07-25 | End: 2024-07-26 | Stop reason: HOSPADM

## 2024-07-25 RX ORDER — MISOPROSTOL 200 UG/1
400 TABLET ORAL
Status: DISCONTINUED | OUTPATIENT
Start: 2024-07-25 | End: 2024-07-25 | Stop reason: HOSPADM

## 2024-07-25 RX ORDER — CARBOPROST TROMETHAMINE 250 UG/ML
250 INJECTION, SOLUTION INTRAMUSCULAR
Status: DISCONTINUED | OUTPATIENT
Start: 2024-07-25 | End: 2024-07-26 | Stop reason: HOSPADM

## 2024-07-25 RX ORDER — NALOXONE HYDROCHLORIDE 0.4 MG/ML
0.4 INJECTION, SOLUTION INTRAMUSCULAR; INTRAVENOUS; SUBCUTANEOUS
Status: DISCONTINUED | OUTPATIENT
Start: 2024-07-25 | End: 2024-07-25 | Stop reason: HOSPADM

## 2024-07-25 RX ORDER — LOPERAMIDE HCL 2 MG
4 CAPSULE ORAL
Status: DISCONTINUED | OUTPATIENT
Start: 2024-07-25 | End: 2024-07-26 | Stop reason: HOSPADM

## 2024-07-25 RX ORDER — MISOPROSTOL 200 UG/1
800 TABLET ORAL
Status: DISCONTINUED | OUTPATIENT
Start: 2024-07-25 | End: 2024-07-25 | Stop reason: HOSPADM

## 2024-07-25 RX ORDER — CITRIC ACID/SODIUM CITRATE 334-500MG
30 SOLUTION, ORAL ORAL
Status: DISCONTINUED | OUTPATIENT
Start: 2024-07-25 | End: 2024-07-25 | Stop reason: HOSPADM

## 2024-07-25 RX ORDER — METHYLERGONOVINE MALEATE 0.2 MG/ML
200 INJECTION INTRAVENOUS
Status: DISCONTINUED | OUTPATIENT
Start: 2024-07-25 | End: 2024-07-26 | Stop reason: HOSPADM

## 2024-07-25 RX ORDER — HYDROCORTISONE 25 MG/G
CREAM TOPICAL 3 TIMES DAILY PRN
Status: DISCONTINUED | OUTPATIENT
Start: 2024-07-25 | End: 2024-07-26 | Stop reason: HOSPADM

## 2024-07-25 RX ORDER — IBUPROFEN 800 MG/1
800 TABLET, FILM COATED ORAL
Status: DISCONTINUED | OUTPATIENT
Start: 2024-07-25 | End: 2024-07-26 | Stop reason: HOSPADM

## 2024-07-25 RX ORDER — METOCLOPRAMIDE 10 MG/1
10 TABLET ORAL EVERY 6 HOURS PRN
Status: DISCONTINUED | OUTPATIENT
Start: 2024-07-25 | End: 2024-07-25 | Stop reason: HOSPADM

## 2024-07-25 RX ORDER — MISOPROSTOL 200 UG/1
400 TABLET ORAL
Status: DISCONTINUED | OUTPATIENT
Start: 2024-07-25 | End: 2024-07-26 | Stop reason: HOSPADM

## 2024-07-25 RX ORDER — LOPERAMIDE HCL 2 MG
2 CAPSULE ORAL
Status: DISCONTINUED | OUTPATIENT
Start: 2024-07-25 | End: 2024-07-26 | Stop reason: HOSPADM

## 2024-07-25 RX ORDER — KETOROLAC TROMETHAMINE 30 MG/ML
30 INJECTION, SOLUTION INTRAMUSCULAR; INTRAVENOUS
Status: DISCONTINUED | OUTPATIENT
Start: 2024-07-25 | End: 2024-07-26 | Stop reason: HOSPADM

## 2024-07-25 RX ORDER — OXYTOCIN 10 [USP'U]/ML
10 INJECTION, SOLUTION INTRAMUSCULAR; INTRAVENOUS
Status: DISCONTINUED | OUTPATIENT
Start: 2024-07-25 | End: 2024-07-26 | Stop reason: HOSPADM

## 2024-07-25 RX ORDER — PROCHLORPERAZINE 25 MG
25 SUPPOSITORY, RECTAL RECTAL EVERY 12 HOURS PRN
Status: DISCONTINUED | OUTPATIENT
Start: 2024-07-25 | End: 2024-07-25 | Stop reason: HOSPADM

## 2024-07-25 RX ORDER — MODIFIED LANOLIN
OINTMENT (GRAM) TOPICAL
Status: DISCONTINUED | OUTPATIENT
Start: 2024-07-25 | End: 2024-07-26 | Stop reason: HOSPADM

## 2024-07-25 RX ORDER — ONDANSETRON 2 MG/ML
4 INJECTION INTRAMUSCULAR; INTRAVENOUS EVERY 6 HOURS PRN
Status: DISCONTINUED | OUTPATIENT
Start: 2024-07-25 | End: 2024-07-25 | Stop reason: HOSPADM

## 2024-07-25 RX ORDER — ACETAMINOPHEN 325 MG/1
650 TABLET ORAL EVERY 4 HOURS PRN
Status: DISCONTINUED | OUTPATIENT
Start: 2024-07-25 | End: 2024-07-25 | Stop reason: HOSPADM

## 2024-07-25 RX ORDER — FENTANYL CITRATE 50 UG/ML
100 INJECTION, SOLUTION INTRAMUSCULAR; INTRAVENOUS
Status: DISCONTINUED | OUTPATIENT
Start: 2024-07-25 | End: 2024-07-25 | Stop reason: HOSPADM

## 2024-07-25 RX ORDER — ACETAMINOPHEN 325 MG/1
650 TABLET ORAL EVERY 4 HOURS PRN
Status: DISCONTINUED | OUTPATIENT
Start: 2024-07-25 | End: 2024-07-26 | Stop reason: HOSPADM

## 2024-07-25 RX ORDER — TRANEXAMIC ACID 10 MG/ML
1 INJECTION, SOLUTION INTRAVENOUS EVERY 30 MIN PRN
Status: DISCONTINUED | OUTPATIENT
Start: 2024-07-25 | End: 2024-07-25 | Stop reason: HOSPADM

## 2024-07-25 RX ADMIN — IBUPROFEN 800 MG: 800 TABLET ORAL at 21:27

## 2024-07-25 RX ADMIN — ACETAMINOPHEN 650 MG: 325 TABLET ORAL at 21:27

## 2024-07-25 RX ADMIN — IBUPROFEN 800 MG: 800 TABLET ORAL at 15:43

## 2024-07-25 RX ADMIN — BENZOCAINE AND LEVOMENTHOL: 200; 5 SPRAY TOPICAL at 18:12

## 2024-07-25 RX ADMIN — LIDOCAINE HYDROCHLORIDE 20 ML: 10 INJECTION, SOLUTION EPIDURAL; INFILTRATION; INTRACAUDAL; PERINEURAL at 15:01

## 2024-07-25 RX ADMIN — ACETAMINOPHEN 650 MG: 325 TABLET ORAL at 17:22

## 2024-07-25 RX ADMIN — Medication 340 ML/HR: at 15:01

## 2024-07-25 RX ADMIN — DOCUSATE SODIUM 100 MG: 100 CAPSULE, LIQUID FILLED ORAL at 15:43

## 2024-07-25 RX ADMIN — WITCH HAZEL: 500 SOLUTION RECTAL; TOPICAL at 18:12

## 2024-07-25 ASSESSMENT — ACTIVITIES OF DAILY LIVING (ADL)
ADLS_ACUITY_SCORE: 20
ADLS_ACUITY_SCORE: 35
ADLS_ACUITY_SCORE: 20

## 2024-07-25 NOTE — PROGRESS NOTES
D:  Patient admitted to 011/-92 via wheelchair with .  A:  Bedside report received from Ashwin Kramer RN. Oriented patient and family to surroundings; call light within reach. 4 Part ID bands double checked with reporting RN.  R:  Patient and  tolerated transfer. Care assumed.    JANET BENITEZ RN on 2024 at 5:35 PM

## 2024-07-25 NOTE — L&D DELIVERY NOTE
VAGINAL DELIVERY NOTE    PRE DELIVERY DIAGNOSIS  1) Intrauterine pregnancy at 40+3   2) Labor      POST DELIVERY DIAGNOSIS  1) Intrauterine pregnancy at Unknown   2) Delivery of a living female.  3) Weight: pending  4) Postpartum hemorrhage: No  5) 2nd Degree Laceration    Delivery Method/Type:       PROVIDER:   Lexi Perez MD     ANESTHESIA:  Local    LACERATION: 2nd degree    QBL: 100     COMPLICATIONS: None    DESCRIPTION OF PROCEDURE  Sarah Hatfield is a 38 year old  with prenatal care with Dr. Pavon who was admitted at 40+3 for labor.  She was 6 cm on admit.  Patient had a .  Delayed cord clamping performed.  No gross fetal defects were observed. Pitocin was administered. Placenta delivered intact with 3 vessel cord. The perineum was then evaluated and noted to have a 2nd degree laceration that was repaired in the usual manner with 3-0 Vicryl.  Hemostasis was noted.     Lexi Perez MD          Liu, Female-Sarah [[id number]]      Labor Event Times      Active labor onset date: 24 Onset time:  2:15 PM        Augmentation: None       Delivery/Placenta Date and Time      Delivery Date: 24 Delivery Time:  3:00 PM   Placenta Date/Time: 2024  3:08 PM  Oxytocin given at the time of delivery: after delivery of baby  Delivering clinician: Lexi Perez MD   Other personnel present at delivery:  Provider Role   Radha Dickinson, RN Registered Nurse   Cindi Delacruz RN Registered Nurse             Vaginal Counts       Initial count performed by 2 team members:  Two Team Members   radha dickinson         Needles Suture Needles Sponges (RETIRED) Instruments   Initial counts       Added to count       Relief counts       Final counts               Placed during labor Accounted for at the end of labor   FSE     IUPC     Cervidil                               Apgars    Living status: Living   1 Minute 5 Minute 10 Minute 15 Minute 20 Minute   Skin color: 0  1       Heart rate: 2  2        Reflex irritability: 2  2       Muscle tone: 2  2       Respiratory effort: 2  2       Total: 8  9       Apgars assigned by: OMAIRA DLEACRUZ RN       Cord      Vessels: 3 Vessels    Cord Complications: None               Cord Blood Disposition: Lab    Gases Sent?: No    Delayed cord clamping?: Yes    Cord Clamping Delay (seconds):  seconds    Stem cell collection?: No            Resuscitation    Methods: None       Skin to Skin and Feeding Plan      Skin to skin initiation date/time: 1841    Skin to skin with: Mother  Skin to skin end date/time:            Labor Events and Shoulder Dystocia    Fetal Tracing Prior to Delivery: Category 1  Shoulder dystocia present?: Neg       Delivery (Maternal) (Provider to Complete) ([id number])    Episiotomy: None  Perineal lacerations: 2nd Repaired?: Yes   Est. blood loss (mL): 100  Repair suture: 3-0 Vicryl  Number of repair packets: 1  Genital tract inspection done: Pos       Blood Loss  Mother: Sarah Hatfield #[id number]     Start of Mother's Information      Delivery Blood Loss  24 1415 - 24 1524      EBL (mL) Hospital Encounter 100 mL    Delivery QBL (mL) Hospital Encounter 100 mL    Total  200 mL               End of Mother's Information  Mother: Sarah Hatfield #[id number]                Delivery - Provider to Complete ([id number])    Delivering clinician: Lexi Perez MD  Delivery Type (Choose the 1 that will go to the Birth History): Vaginal, Spontaneous                         Other personnel:  Provider Role   Ashwin Kramer RN Registered Nurse   Cindi Delacruz RN Registered Nurse                    Placenta    Date/Time: 2024  3:08 PM  Removal: Spontaneous  Disposition: Hospital disposal             Presentation and Position    Presentation: Vertex    Position: Left Occiput Anterior

## 2024-07-25 NOTE — PLAN OF CARE
Problem: Labor  Goal: Hemostasis  Outcome: Adequate for Care Transition  Goal: Stable Fetal Wellbeing  Outcome: Adequate for Care Transition  Goal: Effective Progression to Delivery  Outcome: Adequate for Care Transition  Goal: Absence of Infection Signs and Symptoms  Outcome: Adequate for Care Transition  Intervention: Prevent or Manage Infection  Recent Flowsheet Documentation  Taken 2024 by Ashwin Kramer RN  Infection Prevention: rest/sleep promoted  Goal: Acceptable Pain Control  Outcome: Adequate for Care Transition  Goal: Normal Uterine Contraction Pattern  Outcome: Adequate for Care Transition  Intervention: Monitor and Manage Uterine Activity  Recent Flowsheet Documentation  Taken 2024 by Ashwin Kramer RN  Fluid/Electrolyte Management: fluids provided   Goal Outcome Evaluation:      Plan of Care Reviewed With: patient    Overall Patient Progress: improvingOverall Patient Progress: improving    Outcome Evaluation: 40.3. .  at 1500 with MD Perez and MD resident Lehman. a viable vigorous baby noted at 1500. PRN tylenol and PRN ibuprofen given with relief. total QBL with recovery  blood loss is 186ml. uterus firm 1 cm below the U. initiated breastfeeding. attentive and supportive  and  at bedside. 2nd degree perineal lac with repaired, ice pack to bottom. report given to Della LEAL RN. patient transferred to room 4 to room 11.

## 2024-07-25 NOTE — PROGRESS NOTES
Was called by patients nurse to perform ultrasound to confirm fetal position.   I performed bedside ultrasound. The fetus is in cephalic position.     Maureen Lehman DO

## 2024-07-25 NOTE — PROGRESS NOTES
Notified Dr. Perez of pt status, she will place orders. Informed Primary RN. This RN entered prenatal labs.
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
## 2024-07-26 VITALS
BODY MASS INDEX: 24.19 KG/M2 | OXYGEN SATURATION: 99 % | HEART RATE: 60 BPM | TEMPERATURE: 97.8 F | WEIGHT: 163.3 LBS | DIASTOLIC BLOOD PRESSURE: 69 MMHG | RESPIRATION RATE: 16 BRPM | SYSTOLIC BLOOD PRESSURE: 104 MMHG | HEIGHT: 69 IN

## 2024-07-26 PROBLEM — O09.529 AMA (ADVANCED MATERNAL AGE) MULTIGRAVIDA 35+: Status: ACTIVE | Noted: 2024-07-26

## 2024-07-26 PROBLEM — D50.8 IRON DEFICIENCY ANEMIA SECONDARY TO INADEQUATE DIETARY IRON INTAKE: Status: ACTIVE | Noted: 2021-01-19

## 2024-07-26 PROBLEM — Z37.9 NORMAL LABOR: Status: ACTIVE | Noted: 2024-07-26

## 2024-07-26 PROCEDURE — 250N000013 HC RX MED GY IP 250 OP 250 PS 637: Performed by: OBSTETRICS & GYNECOLOGY

## 2024-07-26 RX ORDER — IBUPROFEN 600 MG/1
600 TABLET, FILM COATED ORAL EVERY 6 HOURS PRN
Qty: 50 TABLET | Refills: 0 | Status: SHIPPED | OUTPATIENT
Start: 2024-07-26

## 2024-07-26 RX ADMIN — ACETAMINOPHEN 650 MG: 325 TABLET ORAL at 08:59

## 2024-07-26 RX ADMIN — IBUPROFEN 800 MG: 800 TABLET ORAL at 10:22

## 2024-07-26 RX ADMIN — ACETAMINOPHEN 650 MG: 325 TABLET ORAL at 19:51

## 2024-07-26 RX ADMIN — ACETAMINOPHEN 650 MG: 325 TABLET ORAL at 13:18

## 2024-07-26 RX ADMIN — IBUPROFEN 800 MG: 800 TABLET ORAL at 16:47

## 2024-07-26 RX ADMIN — DOCUSATE SODIUM 100 MG: 100 CAPSULE, LIQUID FILLED ORAL at 08:59

## 2024-07-26 RX ADMIN — ACETAMINOPHEN 650 MG: 325 TABLET ORAL at 03:38

## 2024-07-26 RX ADMIN — IBUPROFEN 800 MG: 800 TABLET ORAL at 03:38

## 2024-07-26 ASSESSMENT — ACTIVITIES OF DAILY LIVING (ADL)
ADLS_ACUITY_SCORE: 20

## 2024-07-26 NOTE — PLAN OF CARE
Goal Outcome Evaluation:  Problem: Postpartum (Vaginal Delivery)  Goal: Optimal Pain Control and Function  Outcome: Progressing  Intervention: Prevent or Manage Pain  Recent Flowsheet Documentation  Taken 7/26/2024 0859 by Bre Verdin RN  Pain Management Interventions: medication (see MAR)  Pt is taking prn pain medications in order to stay on top of pain management. Pt is rating pain a 2 on the 0-10 pain scale.       Problem: Postpartum (Vaginal Delivery)  Goal: Effective Urinary Elimination  Outcome: Progressing  Pt is up ambulating to the bathroom independently.

## 2024-07-26 NOTE — LACTATION NOTE
"This note was copied from a baby's chart.  Lactation Visit:      Hours since Delivery: 19 hours old.    Gestational Age at Delivery: 40.3 weeks.    Visit with Lactation: Mother is an AMA multip with a history of anemia; she states she breast fed her previous child for 12 months without difficulty. Since birth, infant has been to breast 9 times, has voided x2, stooled x4, and will be weighed during 24 hour  screening. Infant has completed initial BS checks for LGA, and will have a spot check with  screening. Mother states infant has been latching well to breast, and she is wondering if infant could go on deeper. Education given on techniques to obtain a deeper latch; mother verbalized understanding, and was \"thankful for the reminders.\" Discussed  feeding behaviors during first few days of life, hand expression, and the importance of tracking infants feedings and diaper output. Mother states she had an oversupply with her first child, and its her goal to breastfeed this infant for 3-4 months, therefore, we did not initiate pumping after feeding to protect milk supply. Anticipatory guidance given on input/output goals, normal feeding volumes in the  period, and pumping. Encouraged mother to let primary RN know if she would like lactation to return for feeding assistance or if questions arise. Mother aware of lactation resources available to her after discharging from hospital.     Plan: Skin-to-skin prior to feedings. Continue breastfeeding on-demand and/or every 2-3 hours. Track feedings and diaper output.    Has Breast Pump for Home: Yes, Spectra.    Education given: Stages of milk production after delivery, [place] behaviors during first few days of life, How to obtain & maintain a deep, comfortable latch, Listening & watching for swallows, How do I know if my baby is finished & getting enough, Benefits of skin-to-skin prior to feedings, Importance of tracking all feedings and diaper " output, Hand expression, Nutritive vs non-nutritive sucking, Pumping for missed feedings at breast, Burping, Cluster feeding, How to tell if breastfeeding is going well, [place] waking techniques, How to adjust a shallow latch, Techniques for keeping infant actively sucking, including breast compressions, and Breast pump use for home.

## 2024-07-26 NOTE — DISCHARGE SUMMARY
[facility] Discharge Summary    Sarah Hatfield MRN# [id number]   Age: 38 year old YOB: 1985     Date of Admission:  7/25/2024  Date of Discharge::  7/26/2024  Admitting Physician:  Lexi Perez MD  Discharge Physician:  Domenica Lou MD     Home clinic:           Admission Diagnoses:   labor          Discharge Diagnosis:     Normal spontaneous vaginal delivery  Intrauterine pregnancy at 40 weeks gestation          Procedures:     Procedure(s): spontaneous vaginal delivery                    Medications Prior to Admission:     Medications Prior to Admission   Medication Sig Dispense Refill Last Dose    albuterol (PROAIR HFA/PROVENTIL HFA/VENTOLIN HFA) 108 (90 Base) MCG/ACT inhaler Inhale 2 puffs into the lungs every 6 hours as needed for shortness of breath, wheezing or cough 18 g 0     guaiFENesin-dextromethorphan (ROBITUSSIN DM) 100-10 MG/5ML syrup Take 10 mLs by mouth every 4 hours as needed for cough 236 mL 1     [DISCONTINUED] ibuprofen (ADVIL/MOTRIN) 600 MG tablet Take 1 tablet (600 mg) by mouth every 6 hours as needed for moderate pain (4-6) 50 tablet 0              Discharge Medications:     Current Discharge Medication List        CONTINUE these medications which have CHANGED    Details   ibuprofen (ADVIL/MOTRIN) 600 MG tablet Take 1 tablet (600 mg) by mouth every 6 hours as needed for moderate pain  Qty: 50 tablet, Refills: 0    Associated Diagnoses: Viral URI with cough           CONTINUE these medications which have NOT CHANGED    Details   albuterol (PROAIR HFA/PROVENTIL HFA/VENTOLIN HFA) 108 (90 Base) MCG/ACT inhaler Inhale 2 puffs into the lungs every 6 hours as needed for shortness of breath, wheezing or cough  Qty: 18 g, Refills: 0    Comments: Pharmacy may dispense brand covered by insurance (Proair, or proventil or ventolin or generic albuterol inhaler)  Associated Diagnoses: Mild intermittent asthma without complication      guaiFENesin-dextromethorphan  (ROBITUSSIN DM) 100-10 MG/5ML syrup Take 10 mLs by mouth every 4 hours as needed for cough  Qty: 236 mL, Refills: 1    Associated Diagnoses: Viral URI with cough                   Consultations:   No consultations were requested during this admission          Brief History of Labor:   The patient was admitted in labor,  she progressed through normal labor curve to complete.  spontaneous vaginal delivery without complications              Hospital Course:   The patient's hospital course was unremarkable.  On discharge, her pain was well controlled. Vaginal bleeding is similar to peak menstrual flow.  Voiding without difficulty.  Ambulating well and tolerating a normal diet.  No fever.  Breastfeeding well.  Infant is stable.  No bowel movement yet.*  She was discharged on post-partum day #1.    Post-partum hemoglobin:   Hemoglobin   Date Value Ref Range Status   07/25/2024 13.9 11.7 - 15.7 g/dL Final             Discharge Instructions and Follow-Up:     Discharge diet: Regular   Discharge activity: No sex for 6 week(s)   Discharge follow-up: Follow up with [facility]  in 6 weeks   Wound care:            Discharge Disposition:     Discharged to home      Attestation:  I have reviewed today's vital signs, notes, medications, labs and imaging.    Domenica Lou MD

## 2024-07-26 NOTE — PROGRESS NOTES
[facility] RN Care Coordinator Note    Sarah Hatfield  [id number]  1985    Chart reviewed, discharge follow-up plan discussed with care team, needs assessed. Patient requests all follow-up through clinic, declines home care visit. Post-delivery follow-up appointment with OBGYN planned in 6 weeks at [facility].     RN Care Coordinator will continue to follow and assist if needed with discharge plan. Anastasiya Howard RN on 7/26/2024 at 2:14 PM

## 2024-07-26 NOTE — PLAN OF CARE
"Goal Outcome Evaluation: Progressing      Plan of Care Reviewed With: patient    Overall Patient Progress: improvingOverall Patient Progress: improving    Patient's VSS. Fundus is firm 1 cm below umbilicus with scant lochia. Patient is up ad klever and performing self-cares independently. Reporting uterine cramping and perineal pain being treated with ibuprofen, acetaminophen, tucks, dermoplast; Ice and tub bath encouraged this evening. Patient's  is in the room with her and infant, attentive/supportive and participating in cares.    Patient upon arrival to postpartum was unable to void, bladder scan showed 189 mL's; patient declined straight catheter, stated she \"wanted to wait a few more hours\"; patient since has voided adequately and spontaneously 2 times.     Patient aware to fill out birth certificate and PPMA; has not yet completed them.    /53 (BP Location: Left arm, Patient Position: Semi-Wellington's, Cuff Size: Adult Regular)   Temp 98.3  F (36.8  C) (Oral)   Resp 16   Ht 1.753 m (5' 9\")   Wt 79.4 kg (175 lb)   SpO2 97%   Breastfeeding Unknown   BMI 25.84 kg/m      JANET BENITEZ RN on 7/25/2024 at 10:26 PM        Problem: Adult Inpatient Plan of Care  Goal: Optimal Comfort and Wellbeing  Outcome: Progressing     Problem: Postpartum (Vaginal Delivery)  Goal: Effective Urinary Elimination  Outcome: Progressing             "

## 2024-07-26 NOTE — DISCHARGE INSTRUCTIONS
Warning Signs after Having a Baby    Keep this paper on your fridge or somewhere else where you can see it.    Call your provider if you have any of these symptoms up to 12 weeks after having your baby.    Thoughts of hurting yourself or your baby  Pain in your chest or trouble breathing  Severe headache not helped by pain medicine  Eyesight concerns (blurry vision, seeing spots or flashes of light, other changes to eyesight)  Fainting, shaking or other signs of a seizure    Call 9-1-1 if you feel that it is an emergency.     The symptoms below can happen to anyone after giving birth. They can be very serious. Call your provider if you have any of these warning signs.    My provider s phone number: _______________________    Losing too much blood (hemorrhage)    Call your provider if you soak through a pad in less than an hour or pass blood clots bigger than a golf ball. These may be signs that you are bleeding too much.    Blood clots in the legs or lungs    After you give birth, your body naturally clots its blood to help prevent blood loss. Sometimes this increased clotting can happen in other areas of the body, like the legs or lungs. This can block your blood flow and be very dangerous.     Call your provider if you:  Have a red, swollen spot on the back of your leg that is warm or painful when you touch it.   Are coughing up blood.     Infection    Call your provider if you have any of these symptoms:  Fever of 100.4 F (38 C) or higher.  Pain or redness around your stitches if you had an incision.   Any yellow, white, or green fluid coming from places where you had stitches or surgery.    Mood Problems (postpartum depression)    Many people feel sad or have mood changes after having a baby. But for some people, these mood swings are worse.     Call your provider right away if you feel so anxious or nervous that you can't care for yourself or your baby.    Preeclampsia (high blood pressure)    Even if you  didn't have high blood pressure when you were pregnant, you are at risk for the high blood pressure disease called preeclampsia. This risk can last up to 12 weeks after giving birth.     Call your provider if you have:   Pain on your right side under your rib cage  Sudden swelling in the hands and face    Remember: You know your body. If something doesn't feel right, get medical help.     For informational purposes only. Not to replace the advice of your health care provider. Copyright 2020 [facility]. All rights reserved. Clinically reviewed by Hamida Nuñez, RNC-OB, MSN. [employer] [id number] - Rev 02/23.

## 2024-07-26 NOTE — PLAN OF CARE
Sarah's vital and maternal assessment WDL. Pain adequately managed with Tylenol and Ibuprofen, per patient preference. Perineal discomfort managed with Tucks, Dermoplast and nicko bottle. Utilizing nipple cream and given hydrogel pads for nipple discomfort. Up independently, voiding without difficultly and caring for self and infant. Breastfeeding, deep and comfortable latches observed. Sarah and her supportive  Moise are attentive to infant needs/cues, asking appropriate questions and hold infant frequently. Positive attachment behaviors observed.     Problem: Adult Inpatient Plan of Care  Goal: Plan of Care Review  Description: The Plan of Care Review/Shift note should be completed every shift.  The Outcome Evaluation is a brief statement about your assessment that the patient is improving, declining, or no change.  This information will be displayed automatically on your shift  note.  Outcome: Progressing  Flowsheets (Taken 7/26/2024 0429)  Plan of Care Reviewed With:   patient   spouse  Overall Patient Progress: improving     Problem: Breastfeeding  Goal: Effective Breastfeeding  Outcome: Progressing  Intervention: Promote Breast Care and Comfort  Recent Flowsheet Documentation  Taken 7/26/2024 0003 by Florencia Sam RN  Breast Care: Breastfeeding: (utilizing nipple cream, hydrogel pads given) open to air  Intervention: Promote Effective Breastfeeding  Recent Flowsheet Documentation  Taken 7/26/2024 0003 by Florencia Sam RN  Breastfeeding Assistance:   infant latch-on verified   support offered  Intervention: Support Exclusive Breastfeeding Success  Recent Flowsheet Documentation  Taken 7/26/2024 0003 by Florencia Sam RN  Supportive Measures:   active listening utilized   positive reinforcement provided   self-care encouraged  Breastfeeding Support:   diary/feeding log utilized   encouragement provided   maternal hydration promoted   maternal nutrition promoted   maternal rest  encouraged     Problem: Postpartum (Vaginal Delivery)  Goal: Optimal Pain Control and Function  Outcome: Progressing  Intervention: Prevent or Manage Pain  Recent Flowsheet Documentation  Taken 7/26/2024 0338 by Florencia Sam RN  Pain Management Interventions: medication (see MAR)  Taken 7/26/2024 0334 by Florencia Sam RN  Pain Management Interventions:   care clustered   repositioned   rest  Taken 7/26/2024 0003 by Florencia Sam RN  Pain Management Interventions:   care clustered   repositioned   rest  Perineal Care:   absorbent brief/pad changed   perineal spray bottle/warm water use encouraged     Problem: Postpartum (Vaginal Delivery)  Goal: Effective Urinary Elimination  Outcome: Progressing  Intervention: Monitor and Manage Urinary Retention  Recent Flowsheet Documentation  Taken 7/26/2024 0003 by Florencia Sam RN  Urinary Elimination Promotion: frequent voiding encouraged   Goal Outcome Evaluation:      Plan of Care Reviewed With: patient, spouse    Overall Patient Progress: improvingOverall Patient Progress: improving

## 2024-07-26 NOTE — PROGRESS NOTES
1811: Updated Dr Cody pt has had a unproductive cough today and nasal congestion over night. Pt declines wanting testing for cold symptoms. No new orders, okay for pt to discharge to home per provider.

## 2024-07-27 NOTE — DISCHARGE SUMMARY
D:  Patient desires discharge home.  Discharge orders received and entered by provider.  A:  Discharge instructions reviewed with the patient.  All questions and concerns addressed.  R:  Discharge criteria met.  4 Part ID bands double checked.  [place] discharged in car seat with  parents .  The nursing assistant escorted patient to car.  Patient's VSS. She's ambulating and voiding without difficulty.  Good pain control with tylenol and Ibuprofen.  Breastfeeding is going well.  Her nipples are tender but intact.  Ready for discharge.

## 2024-08-08 ENCOUNTER — TRANSFERRED RECORDS (OUTPATIENT)
Dept: HEALTH INFORMATION MANAGEMENT | Facility: CLINIC | Age: 39
End: 2024-08-08
Payer: COMMERCIAL

## 2024-09-16 ENCOUNTER — LAB REQUISITION (OUTPATIENT)
Dept: LAB | Facility: CLINIC | Age: 39
End: 2024-09-16
Payer: COMMERCIAL

## 2024-09-16 DIAGNOSIS — Z12.4 ENCOUNTER FOR SCREENING FOR MALIGNANT NEOPLASM OF CERVIX: ICD-10-CM

## 2024-09-16 PROCEDURE — G0145 SCR C/V CYTO,THINLAYER,RESCR: HCPCS | Mod: ORL | Performed by: OBSTETRICS & GYNECOLOGY

## 2024-09-18 LAB
BKR LAB AP GYN ADEQUACY: NORMAL
BKR LAB AP GYN INTERPRETATION: NORMAL
BKR LAB AP HPV REFLEX: NORMAL
BKR LAB AP LMP: NORMAL
BKR LAB AP PREVIOUS ABNL DX: NORMAL
BKR LAB AP PREVIOUS ABNORMAL: NORMAL
PATH REPORT.COMMENTS IMP SPEC: NORMAL
PATH REPORT.COMMENTS IMP SPEC: NORMAL
PATH REPORT.RELEVANT HX SPEC: NORMAL

## 2024-10-04 ENCOUNTER — MEDICAL CORRESPONDENCE (OUTPATIENT)
Dept: HEALTH INFORMATION MANAGEMENT | Facility: CLINIC | Age: 39
End: 2024-10-04
Payer: COMMERCIAL

## 2024-12-02 ENCOUNTER — MEDICAL CORRESPONDENCE (OUTPATIENT)
Dept: HEALTH INFORMATION MANAGEMENT | Facility: CLINIC | Age: 39
End: 2024-12-02
Payer: COMMERCIAL

## 2025-03-16 ENCOUNTER — HEALTH MAINTENANCE LETTER (OUTPATIENT)
Age: 40
End: 2025-03-16